# Patient Record
Sex: FEMALE | Race: BLACK OR AFRICAN AMERICAN | Employment: OTHER | URBAN - METROPOLITAN AREA
[De-identification: names, ages, dates, MRNs, and addresses within clinical notes are randomized per-mention and may not be internally consistent; named-entity substitution may affect disease eponyms.]

---

## 2019-09-18 ENCOUNTER — OFFICE VISIT (OUTPATIENT)
Dept: FAMILY MEDICINE CLINIC | Age: 49
End: 2019-09-18

## 2019-09-18 ENCOUNTER — HOSPITAL ENCOUNTER (OUTPATIENT)
Dept: LAB | Age: 49
Discharge: HOME | End: 2019-09-18

## 2019-09-18 ENCOUNTER — TELEPHONE (OUTPATIENT)
Dept: FAMILY MEDICINE CLINIC | Age: 49
End: 2019-09-18

## 2019-09-18 VITALS
SYSTOLIC BLOOD PRESSURE: 160 MMHG | HEART RATE: 62 BPM | BODY MASS INDEX: 37.83 KG/M2 | OXYGEN SATURATION: 98 % | RESPIRATION RATE: 16 BRPM | TEMPERATURE: 95.6 F | DIASTOLIC BLOOD PRESSURE: 90 MMHG | WEIGHT: 241 LBS | HEIGHT: 67 IN

## 2019-09-18 DIAGNOSIS — Z12.11 SCREENING FOR COLON CANCER: ICD-10-CM

## 2019-09-18 DIAGNOSIS — I10 ESSENTIAL HYPERTENSION: Primary | ICD-10-CM

## 2019-09-18 DIAGNOSIS — R51.9 ACUTE NONINTRACTABLE HEADACHE, UNSPECIFIED HEADACHE TYPE: ICD-10-CM

## 2019-09-18 DIAGNOSIS — M21.962 ACQUIRED DEFORMITY OF LEFT KNEE: ICD-10-CM

## 2019-09-18 DIAGNOSIS — H54.3 LOW VISION, BOTH EYES: ICD-10-CM

## 2019-09-18 DIAGNOSIS — E66.01 SEVERE OBESITY (HCC): ICD-10-CM

## 2019-09-18 DIAGNOSIS — I10 ESSENTIAL HYPERTENSION: ICD-10-CM

## 2019-09-18 LAB
ALBUMIN SERPL-MCNC: 4.2 G/DL (ref 3.4–5)
ALBUMIN/GLOB SERPL: 1.4 {RATIO} (ref 0.8–1.7)
ALP SERPL-CCNC: 142 U/L (ref 45–117)
ALT SERPL-CCNC: 103 U/L (ref 13–56)
ANION GAP SERPL CALC-SCNC: 4 MMOL/L (ref 3–18)
AST SERPL-CCNC: 40 U/L (ref 10–38)
BASOPHILS # BLD: 0 K/UL (ref 0–0.1)
BASOPHILS NFR BLD: 0 % (ref 0–2)
BILIRUB SERPL-MCNC: 0.4 MG/DL (ref 0.2–1)
BUN SERPL-MCNC: 11 MG/DL (ref 7–18)
BUN/CREAT SERPL: 12 (ref 12–20)
CALCIUM SERPL-MCNC: 10.3 MG/DL (ref 8.5–10.1)
CHLORIDE SERPL-SCNC: 104 MMOL/L (ref 100–111)
CO2 SERPL-SCNC: 28 MMOL/L (ref 21–32)
CREAT SERPL-MCNC: 0.9 MG/DL (ref 0.6–1.3)
DIFFERENTIAL METHOD BLD: NORMAL
EOSINOPHIL # BLD: 0.2 K/UL (ref 0–0.4)
EOSINOPHIL NFR BLD: 4 % (ref 0–5)
ERYTHROCYTE [DISTWIDTH] IN BLOOD BY AUTOMATED COUNT: 13.7 % (ref 11.6–14.5)
ERYTHROCYTE [SEDIMENTATION RATE] IN BLOOD: 11 MM/HR (ref 0–20)
EST. AVERAGE GLUCOSE BLD GHB EST-MCNC: 272 MG/DL
GLOBULIN SER CALC-MCNC: 3.1 G/DL (ref 2–4)
GLUCOSE SERPL-MCNC: 245 MG/DL (ref 74–99)
HBA1C MFR BLD: 11.1 % (ref 4.2–5.6)
HCT VFR BLD AUTO: 38.2 % (ref 35–45)
HGB BLD-MCNC: 12.7 G/DL (ref 12–16)
LYMPHOCYTES # BLD: 2.8 K/UL (ref 0.9–3.6)
LYMPHOCYTES NFR BLD: 44 % (ref 21–52)
MCH RBC QN AUTO: 27.6 PG (ref 24–34)
MCHC RBC AUTO-ENTMCNC: 33.2 G/DL (ref 31–37)
MCV RBC AUTO: 83 FL (ref 74–97)
MONOCYTES # BLD: 0.4 K/UL (ref 0.05–1.2)
MONOCYTES NFR BLD: 6 % (ref 3–10)
NEUTS SEG # BLD: 3 K/UL (ref 1.8–8)
NEUTS SEG NFR BLD: 46 % (ref 40–73)
PLATELET # BLD AUTO: 275 K/UL (ref 135–420)
PMV BLD AUTO: 11 FL (ref 9.2–11.8)
POTASSIUM SERPL-SCNC: 4.4 MMOL/L (ref 3.5–5.5)
PROT SERPL-MCNC: 7.3 G/DL (ref 6.4–8.2)
RBC # BLD AUTO: 4.6 M/UL (ref 4.2–5.3)
SODIUM SERPL-SCNC: 136 MMOL/L (ref 136–145)
TSH SERPL DL<=0.05 MIU/L-ACNC: 1.69 UIU/ML (ref 0.36–3.74)
WBC # BLD AUTO: 6.5 K/UL (ref 4.6–13.2)

## 2019-09-18 PROCEDURE — 83036 HEMOGLOBIN GLYCOSYLATED A1C: CPT

## 2019-09-18 PROCEDURE — 84443 ASSAY THYROID STIM HORMONE: CPT

## 2019-09-18 PROCEDURE — 80061 LIPID PANEL: CPT

## 2019-09-18 PROCEDURE — 80053 COMPREHEN METABOLIC PANEL: CPT

## 2019-09-18 PROCEDURE — 85652 RBC SED RATE AUTOMATED: CPT

## 2019-09-18 PROCEDURE — 85025 COMPLETE CBC W/AUTO DIFF WBC: CPT

## 2019-09-18 RX ORDER — LOSARTAN POTASSIUM 50 MG/1
TABLET ORAL
COMMUNITY
End: 2019-09-18 | Stop reason: DRUGHIGH

## 2019-09-18 RX ORDER — TERBINAFINE HYDROCHLORIDE 250 MG/1
TABLET ORAL
COMMUNITY
End: 2019-09-18

## 2019-09-18 RX ORDER — TROLAMINE SALICYLATE 10 G/100G
CREAM TOPICAL
COMMUNITY
Start: 2016-06-29 | End: 2019-09-18

## 2019-09-18 RX ORDER — IBUPROFEN 600 MG/1
TABLET ORAL
COMMUNITY
Start: 2016-05-10 | End: 2019-09-18

## 2019-09-18 RX ORDER — ONDANSETRON 4 MG/1
TABLET, ORALLY DISINTEGRATING ORAL
COMMUNITY
Start: 2018-08-06 | End: 2019-09-18

## 2019-09-18 RX ORDER — PREDNISONE 20 MG/1
TABLET ORAL
COMMUNITY
Start: 2016-06-29 | End: 2019-09-18

## 2019-09-18 RX ORDER — OXYCODONE AND ACETAMINOPHEN 5; 325 MG/1; MG/1
TABLET ORAL
COMMUNITY
End: 2019-09-18

## 2019-09-18 RX ORDER — LOSARTAN POTASSIUM 100 MG/1
100 TABLET ORAL DAILY
COMMUNITY
End: 2020-01-02 | Stop reason: SDUPTHER

## 2019-09-18 RX ORDER — SULFAMETHOXAZOLE AND TRIMETHOPRIM 800; 160 MG/1; MG/1
TABLET ORAL
COMMUNITY
End: 2019-09-18

## 2019-09-18 RX ORDER — NAPROXEN 500 MG/1
500 TABLET, DELAYED RELEASE ORAL
COMMUNITY
Start: 2017-12-13 | End: 2019-09-18

## 2019-09-18 RX ORDER — OMEPRAZOLE 20 MG/1
20 CAPSULE, DELAYED RELEASE ORAL
COMMUNITY
End: 2019-09-18

## 2019-09-18 RX ORDER — CYCLOBENZAPRINE HCL 5 MG
TABLET ORAL
COMMUNITY
End: 2019-09-18

## 2019-09-18 RX ORDER — PROMETHAZINE HYDROCHLORIDE 25 MG/1
TABLET ORAL
COMMUNITY
End: 2019-09-18

## 2019-09-18 RX ORDER — METOCLOPRAMIDE 10 MG/1
TABLET ORAL
COMMUNITY
End: 2019-09-18

## 2019-09-18 RX ORDER — AMLODIPINE BESYLATE 10 MG/1
TABLET ORAL
COMMUNITY
End: 2019-09-18

## 2019-09-18 RX ORDER — AZITHROMYCIN 250 MG/1
TABLET, FILM COATED ORAL
COMMUNITY
End: 2019-09-18 | Stop reason: ALTCHOICE

## 2019-09-18 RX ORDER — TRAMADOL HYDROCHLORIDE 50 MG/1
TABLET ORAL
COMMUNITY
Start: 2016-05-10 | End: 2019-09-18

## 2019-09-18 RX ORDER — PREDNISONE 5 MG/1
TABLET ORAL
COMMUNITY
End: 2019-09-18

## 2019-09-18 RX ORDER — NITROFURANTOIN 25; 75 MG/1; MG/1
CAPSULE ORAL
COMMUNITY
End: 2019-09-18

## 2019-09-18 RX ORDER — PREDNISONE 10 MG/1
TABLET ORAL
COMMUNITY
End: 2019-09-18

## 2019-09-18 RX ORDER — ALBUTEROL SULFATE 90 UG/1
AEROSOL, METERED RESPIRATORY (INHALATION)
COMMUNITY
End: 2020-01-03

## 2019-09-18 RX ORDER — BISMUTH SUBSALICYLATE 262 MG/15ML
30 LIQUID ORAL
COMMUNITY
End: 2019-09-18

## 2019-09-18 RX ORDER — LOSARTAN POTASSIUM 100 MG/1
TABLET ORAL
Refills: 1 | COMMUNITY
Start: 2019-08-19 | End: 2019-09-18 | Stop reason: SDUPTHER

## 2019-09-18 NOTE — PATIENT INSTRUCTIONS
You may qualify for a Free Mammogram and/or Pap Smear from [facility]. Please call [phone] to start the screening process so you can be scheduled for these important services. The process cannot begin until you make the call to begin screening.

## 2019-09-18 NOTE — PROGRESS NOTES
Chief Complaint   Patient presents with    Headache     x 1 week, vision blurred x 4months ,dizziness, off-balance x 3 months specks in both eyes x 1 month     Chief Complaint   Patient presents with    Headache     x 1 week, vision blurred x 4months ,dizziness, off-balance x 3 months specks in both eyes x 1 month    New Patient     1. When and where did you last receive medical care? Yes When: 8/2019- PCP Dr. Deondre Bailey  [facility]    2. When and where did you last have preventive care such as mammogram, pap smears or colon screening? PAP 2016? MAMMO 2017 colonoscopy 2014 polyps removed   3. What is your current living situation (for example, live alone, live in home with immediate family members)? Lives alone    4. Do you have any problems with communication such trouble seeing, hearing, or understanding instructions? No    5. Do you have an advance directive? This is a document that you can give to family members with instructions for how you would want them to make health care decisions for you if you were unable to speak for yourself. (For example, unconscious, delerious)No    PMH/FH/Social Hx reviewed and updated as needed     Applicable screenings reviewed and updated as needed  Medication reconciliation performed. Patient does not know need medication refills. Health Maintenance reviewed.

## 2019-09-18 NOTE — TELEPHONE ENCOUNTER
Patient advised of appointment with neurology Dr Latha Diaz 9/24/19 arrive at 8:30 for a 9:00 appointment [address] [facility],[address], [address]

## 2019-09-18 NOTE — PROGRESS NOTES
JEAN CARLOS Morales is a 47 y.o. female being seen today for   Chief Complaint   Patient presents with    Headache     x 1 week, vision blurred x 4months ,dizziness, off-balance x 3 months specks in both eyes x 1 month    New Patient   . she states that for the last few months her vision is not as good. She uses readers but for the last week they are not helping as much. For the last week she has specks in her vision. She gets some pain in right eye when reading. Today she had a headache when she woke up on right frontal head. Some episodes of feeling off balance. Does have hypertension. She is on bp medicine from her PCP Dr Herrera Britt. Last seen about 1 and 1/2 months ago. Family history of lupus. Past Medical History:   Diagnosis Date    Asthma     Bronchitis     Hiatal hernia 2013    Hypertension     Knee deformity, acquired 1996    left knee    Pancreatitis 2005         ROS  Patient states that she is feeling well. Denies complaints of chest pain, shortness of breath, swelling of legs, dizziness or weakness. she denies nausea, vomiting or diarrhea. Current Outpatient Medications   Medication Sig    losartan (COZAAR) 100 mg tablet Take 100 mg by mouth daily.  amLODIPine (NORVASC) 10 mg tablet Take 10 mg by mouth daily.  LORazepam (ATIVAN) 2 mg tablet Take 1 Tab by mouth once for 1 dose. Max Daily Amount: 2 mg.  insulin glargine (LANTUS,BASAGLAR) 100 unit/mL (3 mL) inpn Increase to 12 units daily. Indications: type 2 diabetes mellitus    ondansetron hcl (ZOFRAN) 4 mg tablet Take 1 Tab by mouth every eight (8) hours as needed for Nausea.  amitriptyline (ELAVIL) 10 mg tablet Take 1 Tab by mouth nightly for 30 days.  metFORMIN ER (GLUCOPHAGE XR) 500 mg tablet Take 1 Tab by mouth daily (with dinner).  cyclobenzaprine (FLEXERIL) 10 mg tablet Take 1 Tab by mouth nightly.     albuterol (VENTOLIN HFA) 90 mcg/actuation inhaler Ventolin HFA 90 mcg/actuation aerosol inhaler     No current facility-administered medications for this visit. PE  Visit Vitals  /90 (BP 1 Location: Left arm, BP Patient Position: Sitting) Comment (BP 1 Location): manual   Pulse 62   Temp 95.6 °F (35.3 °C) (Temporal)   Resp 16   Ht 5' 7\" (1.702 m)   Wt 241 lb (109.3 kg)   SpO2 98%   BMI 37.75 kg/m²        Alert and oriented with normal mood and affect. she is well developed and well nourished . Lungs are clear without wheezing. Heart rate is regular without murmurs or gallops. There is no lower extremity edema. CN II-XII intact although there is incomplete ability to move eyes medially with accomodation    Results for orders placed or performed during the hospital encounter of 12/03/09   HCG, UR, QL - POC   Result Value Ref Range    Pregnancy test,urine (POC) NEGATIVE  NEGATIVE         Assessment and Plan:        ICD-10-CM ICD-9-CM    1. Essential hypertension I43 548.9 METABOLIC PANEL, COMPREHENSIVE      LIPID PANEL      HEMOGLOBIN A1C WITH EAG      TSH 3RD GENERATION      CBC WITH AUTOMATED DIFF   2. Acquired deformity of left knee M21.962 736.6    3. Severe obesity ([facility] [address] E66.01 278.01    4. Screening for colon cancer Z12.11 V76.51 OCCULT BLOOD IMMUNOASSAY,DIAGNOSTIC   5. Acute nonintractable headache, unspecified headache type R51 784.0 SED RATE (ESR)      REFERRAL TO NEUROLOGY   6. Low vision, both eyes H54.3 369.20 REFERRAL TO NEUROLOGY      REFERRAL TO OPHTHALMOLOGY     Unclear cause of symptoms. Referral to neuro and ophtho  Check labs  For worsening will go to ED.   May benefit from imaging    Sharon Holman MD

## 2019-09-18 NOTE — PROGRESS NOTES
JEAN CARLOS Bundy is a 47 y.o. female being seen today for   Chief Complaint   Patient presents with    Headache     x 1 week, vision blurred x 4months ,dizziness, off-balance x 3 months specks in both eyes x 1 month    New Patient   . she states that for the last few months her vision is not as good. She uses readers but for the last week they are not helping as much. For the last week she has specks in her vision. She gets some pain in right eye when reading. Today she had a headache when she woke up on right frontal head. Does have hypertension. She is on bp medicine from her PCP doctor Radha Gusman. Last seen about 1 and 1/2 months ago. Past Medical History:   Diagnosis Date    Asthma     Bronchitis     Hiatal hernia 2013    Hypertension     Knee deformity, acquired 1996    left knee    Pancreatitis 2005         ROS  Patient states that she is feeling well. Denies complaints of chest pain, shortness of breath, swelling of legs, dizziness or weakness. she denies nausea, vomiting or diarrhea. Current Outpatient Medications   Medication Sig    losartan (COZAAR) 100 mg tablet losartan 100 mg tablet    amLODIPine (NORVASC) 10 mg tablet Take  by mouth daily.  predniSONE (STERAPRED) 5 mg dose pack prednisone 5 mg tablets in a dose pack    albuterol (VENTOLIN HFA) 90 mcg/actuation inhaler Ventolin HFA 90 mcg/actuation aerosol inhaler    azithromycin (ZITHROMAX) 250 mg tablet azithromycin 250 mg tablet    bismuth subsalicylate (PEPTO-BISMOL) 262 mg/15 mL suspension Take 30 mL by mouth.  cyclobenzaprine (FLEXERIL) 5 mg tablet cyclobenzaprine 5 mg tablet    ibuprofen (MOTRIN) 600 mg tablet ibuprofen 600 mg tablet    losartan (COZAAR) 100 mg tablet TAKE 1 TABLET BY MOUTH ONCE DAILY    losartan (COZAAR) 50 mg tablet losartan 50 mg tablet    metoclopramide HCl (REGLAN) 10 mg tablet metoclopramide 10 mg tablet    naproxen EC (NAPROSYN EC) 500 mg EC tablet Take 500 mg by mouth.     nitrofurantoin, macrocrystal-monohydrate, (MACROBID) 100 mg capsule Macrobid 100 mg capsule   Take 1 capsule every 12 hours by oral route for 7 days.  omeprazole (PRILOSEC) 20 mg capsule Take 20 mg by mouth.  ondansetron (ZOFRAN ODT) 4 mg disintegrating tablet ondansetron 4 mg disintegrating tablet    oxyCODONE-acetaminophen (PERCOCET) 5-325 mg per tablet oxycodone-acetaminophen 5 mg-325 mg tablet    promethazine (PHENERGAN) 25 mg tablet promethazine 25 mg tablet    trimethoprim-sulfamethoxazole (BACTRIM DS, SEPTRA DS) 160-800 mg per tablet sulfamethoxazole 800 mg-trimethoprim 160 mg tablet    terbinafine HCl (LAMISIL) 250 mg tablet terbinafine HCl 250 mg tablet    traMADol (ULTRAM) 50 mg tablet tramadol 50 mg tablet    trolamine salicylate (MYOFLEX) 10 % topical cream Apply  to affected area.  amLODIPine (NORVASC) 10 mg tablet amlodipine 10 mg tablet    predniSONE (DELTASONE) 20 mg tablet 3 tabs po in am x 2 days, then 2 tabs x 2 days. Or as dir. Do not combine c NSAIDs.  predniSONE (DELTASONE) 10 mg tablet prednisone 10 mg tablet    methocarbamol (ROBAXIN) 750 mg tablet Take 1 Tab by mouth four (4) times daily. No current facility-administered medications for this visit. PE  Visit Vitals  /90 (BP 1 Location: Left arm, BP Patient Position: Sitting) Comment (BP 1 Location): manual   Pulse 62   Temp 95.6 °F (35.3 °C) (Temporal)   Resp 16   Ht 5' 7\" (1.702 m)   Wt 241 lb (109.3 kg)   SpO2 98%   BMI 37.75 kg/m²        Alert and oriented with normal mood and affect. she is well developed and well nourished . Lungs are clear without wheezing. Heart rate is regular without murmurs or gallops. There is no lower extremity edema. Results for orders placed or performed during the hospital encounter of 12/03/09   HCG, UR, QL - POC   Result Value Ref Range    Pregnancy test,urine (POC) NEGATIVE  NEGATIVE         Assessment and Plan:        ICD-10-CM ICD-9-CM    1.  Acquired deformity of left knee M21.962 736.6    2.  Severe obesity ([address] E66.01 278.01            Kale Lozano MD

## 2019-09-19 LAB
CHOLEST SERPL-MCNC: 123 MG/DL
HDLC SERPL-MCNC: 35 MG/DL (ref 40–60)
HDLC SERPL: 3.5 {RATIO} (ref 0–5)
LDLC SERPL CALC-MCNC: 73.8 MG/DL (ref 0–100)
LIPID PROFILE,FLP: ABNORMAL
TRIGL SERPL-MCNC: 71 MG/DL (ref ?–150)
VLDLC SERPL CALC-MCNC: 14.2 MG/DL

## 2019-09-20 ENCOUNTER — TELEPHONE (OUTPATIENT)
Dept: FAMILY MEDICINE CLINIC | Age: 49
End: 2019-09-20

## 2019-09-20 NOTE — TELEPHONE ENCOUNTER
Can you  call this nice patient and let her know her blood sugar is high. She has diabetes and this could be causing her symptoms. She should cut way back on sugary and starchy foods and follow up in clinic at her earliest convenience. Drink plenty of water.

## 2019-09-24 ENCOUNTER — OFFICE VISIT (OUTPATIENT)
Dept: FAMILY MEDICINE CLINIC | Age: 49
End: 2019-09-24

## 2019-09-24 ENCOUNTER — OFFICE VISIT (OUTPATIENT)
Dept: NEUROLOGY | Age: 49
End: 2019-09-24

## 2019-09-24 VITALS
HEIGHT: 67 IN | OXYGEN SATURATION: 98 % | SYSTOLIC BLOOD PRESSURE: 152 MMHG | BODY MASS INDEX: 37.89 KG/M2 | TEMPERATURE: 97.7 F | WEIGHT: 241.4 LBS | HEART RATE: 58 BPM | RESPIRATION RATE: 20 BRPM | DIASTOLIC BLOOD PRESSURE: 94 MMHG

## 2019-09-24 VITALS
WEIGHT: 240 LBS | HEIGHT: 67 IN | HEART RATE: 55 BPM | RESPIRATION RATE: 16 BRPM | BODY MASS INDEX: 37.67 KG/M2 | SYSTOLIC BLOOD PRESSURE: 130 MMHG | OXYGEN SATURATION: 97 % | DIASTOLIC BLOOD PRESSURE: 90 MMHG | TEMPERATURE: 97.7 F

## 2019-09-24 DIAGNOSIS — E11.49 CONTROLLED TYPE 2 DIABETES MELLITUS WITH OTHER NEUROLOGIC COMPLICATION, WITH LONG-TERM CURRENT USE OF INSULIN (HCC): Primary | ICD-10-CM

## 2019-09-24 DIAGNOSIS — Z79.4 CONTROLLED TYPE 2 DIABETES MELLITUS WITH OTHER NEUROLOGIC COMPLICATION, WITH LONG-TERM CURRENT USE OF INSULIN (HCC): Primary | ICD-10-CM

## 2019-09-24 DIAGNOSIS — G44.52 NEW DAILY PERSISTENT HEADACHE: Primary | ICD-10-CM

## 2019-09-24 DIAGNOSIS — H54.3 LOW VISION, BOTH EYES: ICD-10-CM

## 2019-09-24 RX ORDER — INSULIN GLARGINE 100 [IU]/ML
INJECTION, SOLUTION SUBCUTANEOUS
Qty: 5 PEN | Refills: 0 | Status: SHIPPED | COMMUNITY
Start: 2019-09-24 | End: 2019-10-01

## 2019-09-24 RX ORDER — AMITRIPTYLINE HYDROCHLORIDE 10 MG/1
10 TABLET, FILM COATED ORAL
Qty: 30 TAB | Refills: 2 | Status: SHIPPED | OUTPATIENT
Start: 2019-09-24 | End: 2019-10-15

## 2019-09-24 RX ORDER — METFORMIN HYDROCHLORIDE 500 MG/1
500 TABLET, EXTENDED RELEASE ORAL
Qty: 30 TAB | Refills: 5 | Status: SHIPPED | OUTPATIENT
Start: 2019-09-24 | End: 2020-01-02 | Stop reason: SDUPTHER

## 2019-09-24 RX ORDER — CYCLOBENZAPRINE HCL 10 MG
10 TABLET ORAL
Qty: 30 TAB | Refills: 1 | Status: SHIPPED | OUTPATIENT
Start: 2019-09-24 | End: 2020-01-03

## 2019-09-24 NOTE — PATIENT INSTRUCTIONS
You may qualify for a Free Mammogram and/or Pap Smear from [facility]. Please call [phone] to start the screening process so you can be scheduled for these important services. The process cannot begin until you make the call to begin screening. Neck: Exercises  Introduction  Here are some examples of exercises for you to try. The exercises may be suggested for a condition or for rehabilitation. Start each exercise slowly. Ease off the exercises if you start to have pain. You will be told when to start these exercises and which ones will work best for you. How to do the exercises  Neck stretch    1. This stretch works best if you keep your shoulder down as you lean away from it. To help you remember to do this, start by relaxing your shoulders and lightly holding on to your thighs or your chair. 2. Tilt your head toward your shoulder and hold for 15 to 30 seconds. Let the weight of your head stretch your muscles. 3. If you would like a little added stretch, use your hand to gently and steadily pull your head toward your shoulder. For example, keeping your right shoulder down, lean your head to the left. 4. Repeat 2 to 4 times toward each shoulder. Diagonal neck stretch    1. Turn your head slightly toward the direction you will be stretching, and tilt your head diagonally toward your chest and hold for 15 to 30 seconds. 2. If you would like a little added stretch, use your hand to gently and steadily pull your head forward on the diagonal.  3. Repeat 2 to 4 times toward each side. Dorsal glide stretch    1. Sit or stand tall and look straight ahead. 2. Slowly tuck your chin as you glide your head backward over your body  3. Hold for a count of 6, and then relax for up to 10 seconds. 4. Repeat 8 to 12 times. Chest and shoulder stretch    1. Sit or stand tall and glide your head backward as in the dorsal glide stretch.   2. Raise both arms so that your hands are next to your ears.  3. Take a deep breath, and as you breathe out, lower your elbows down and behind your back. You will feel your shoulder blades slide down and together, and at the same time you will feel a stretch across your chest and the front of your shoulders. 4. Hold for about 6 seconds, and then relax for up to 10 seconds. 5. Repeat 8 to 12 times. Strengthening: Hands on head    1. Move your head backward, forward, and side to side against gentle pressure from your hands, holding each position for about 6 seconds. 2. Repeat 8 to 12 times. Follow-up care is a key part of your treatment and safety. Be sure to make and go to all appointments, and call your doctor if you are having problems. It's also a good idea to know your test results and keep a list of the medicines you take. Where can you learn more? Go to http://dominik-mahin.info/. Enter P975 in the search box to learn more about \"Neck: Exercises. \"  Current as of: June 26, 2019  Content Version: 12.2  © 4425-4795 [employer]. Care instructions adapted under license by [employer] (which disclaims liability or warranty for this information). If you have questions about a medical condition or this instruction, always ask your healthcare professional. [address] any warranty or liability for your use of this information.

## 2019-09-24 NOTE — PROGRESS NOTES
Cyndi Falcon is a 47 y.o. female new patient in today to discuss HAs. Patient reports active head pain in front and back 6/10. Learning assessment previously completed 9/18/2019; primary language is [place].

## 2019-09-24 NOTE — PROGRESS NOTES
HPI  Nando Jennings is a 47 y.o. female being seen today for   Chief Complaint   Patient presents with    Follow-up   . follow up for this pt with new dx of diabetes. She continues to have HA, dizziness, occasional loss of balance. she states that she does not eat many carbs and feels her diet is healthy. Did see neuro who ordered mri of her brain  Has not yet seen ophtho    Past Medical History:   Diagnosis Date    Asthma     Bronchitis     Hiatal hernia 2013    Hypertension     Knee deformity, acquired 1996    left knee    Pancreatitis 2005         ROS  Patient states that she is feeling well. Denies complaints of chest pain, shortness of breath, swelling of legs, dizziness or weakness. she denies nausea, vomiting or diarrhea. Current Outpatient Medications   Medication Sig    amitriptyline (ELAVIL) 10 mg tablet Take 1 Tab by mouth nightly for 30 days.  insulin glargine (LANTUS,BASAGLAR) 100 unit/mL (3 mL) inpn 10 units daily    metFORMIN ER (GLUCOPHAGE XR) 500 mg tablet Take 1 Tab by mouth daily (with dinner).  cyclobenzaprine (FLEXERIL) 10 mg tablet Take 1 Tab by mouth nightly.  albuterol (VENTOLIN HFA) 90 mcg/actuation inhaler Ventolin HFA 90 mcg/actuation aerosol inhaler    losartan (COZAAR) 100 mg tablet losartan 100 mg tablet    amLODIPine (NORVASC) 10 mg tablet Take  by mouth daily.  ondansetron hcl (ZOFRAN) 4 mg tablet Take 1 Tab by mouth every eight (8) hours as needed for Nausea. No current facility-administered medications for this visit. PE  Visit Vitals  /90 (BP 1 Location: Left arm, BP Patient Position: Sitting)   Pulse (!) 55   Temp 97.7 °F (36.5 °C) (Temporal)   Resp 16   Ht 5' 7\" (1.702 m)   Wt 240 lb (108.9 kg)   LMP 01/24/2014 (LMP Unknown)   SpO2 97%   BMI 37.59 kg/m²        Alert and oriented with normal mood and affect. she is well developed and well nourished . Lungs are clear without wheezing.  Heart rate is regular without murmurs or gallops. There is no lower extremity edema. Assessment and Plan:        ICD-10-CM ICD-9-CM    1. 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 Low vision, both eyes H54.3 369.20      Symptoms may be due to hyperglycemia  Reviewed insulin is the fastest way to control her sugars.   She agrees to try    Sample basaglar 10 units daily and metformin xr 500mg  Refer to nurse navigator for follow up and insulin titration        Demetrice Torres MD
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
## 2019-09-24 NOTE — LETTER
9/24/19 Patient: Hoda Perez YOB: 1970 Date of Visit: 9/24/2019 Sharmin Rocha MD 
[facility] [id number] [phone] VIA In Basket Dear Sharmin Rocha MD, Thank you for referring Ms. Hoda Perez to [facility] for evaluation. My notes for this consultation are attached. If you have questions, please do not hesitate to call me. I look forward to following your patient along with you. Sincerely, Gustabo Becerra MD

## 2019-09-25 ENCOUNTER — TELEPHONE (OUTPATIENT)
Dept: FAMILY MEDICINE CLINIC | Age: 49
End: 2019-09-25

## 2019-09-25 NOTE — TELEPHONE ENCOUNTER
Patient seen by Dr Kathyanne Phoenix on 9/24/19 to discuss diabetes diagnosis and medication prescribed

## 2019-09-26 RX ORDER — ONDANSETRON 4 MG/1
4 TABLET, FILM COATED ORAL
Qty: 21 TAB | Refills: 1 | Status: SHIPPED | OUTPATIENT
Start: 2019-09-26 | End: 2020-01-03

## 2019-09-26 NOTE — TELEPHONE ENCOUNTER
Patient states still nauseated got glucometer yesterday blood sugar was 371 yesterday hasn't taken it today.  Patient also stated that she thought the short acting insulin was part of the treatment plan you discussed

## 2019-09-27 NOTE — TELEPHONE ENCOUNTER
Patient advised The metformin is probably causing her nausea.  If its not that bad she can keep taking it and the nausea will go away. Will send in some zofran to her pharmacy for her. And she  can come to  a sample of humalog from clinic any day and we would need to review sliding scale with her. and she can also increse her lantus to 14 units daily

## 2019-10-01 ENCOUNTER — HOSPITAL ENCOUNTER (OUTPATIENT)
Dept: MRI IMAGING | Age: 49
Discharge: HOME | End: 2019-10-01
Attending: STUDENT IN AN ORGANIZED HEALTH CARE EDUCATION/TRAINING PROGRAM

## 2019-10-01 ENCOUNTER — OFFICE VISIT (OUTPATIENT)
Dept: FAMILY MEDICINE CLINIC | Age: 49
End: 2019-10-01

## 2019-10-01 VITALS
TEMPERATURE: 98 F | OXYGEN SATURATION: 98 % | HEIGHT: 67 IN | RESPIRATION RATE: 18 BRPM | HEART RATE: 59 BPM | DIASTOLIC BLOOD PRESSURE: 80 MMHG | BODY MASS INDEX: 37.51 KG/M2 | WEIGHT: 239 LBS | SYSTOLIC BLOOD PRESSURE: 130 MMHG

## 2019-10-01 DIAGNOSIS — Z00.00 HEALTHCARE MAINTENANCE: Primary | ICD-10-CM

## 2019-10-01 DIAGNOSIS — I10 ESSENTIAL HYPERTENSION: ICD-10-CM

## 2019-10-01 DIAGNOSIS — Z79.4 CONTROLLED TYPE 2 DIABETES MELLITUS WITH OTHER NEUROLOGIC COMPLICATION, WITH LONG-TERM CURRENT USE OF INSULIN (HCC): ICD-10-CM

## 2019-10-01 DIAGNOSIS — E11.49 CONTROLLED TYPE 2 DIABETES MELLITUS WITH OTHER NEUROLOGIC COMPLICATION, WITH LONG-TERM CURRENT USE OF INSULIN (HCC): ICD-10-CM

## 2019-10-01 DIAGNOSIS — M54.2 NECK PAIN: ICD-10-CM

## 2019-10-01 DIAGNOSIS — G44.52 NEW DAILY PERSISTENT HEADACHE: ICD-10-CM

## 2019-10-01 LAB — GLUCOSE POC: 206 MG/DL

## 2019-10-01 RX ORDER — INSULIN GLARGINE 100 [IU]/ML
INJECTION, SOLUTION SUBCUTANEOUS
Qty: 5 PEN | Refills: 0 | Status: SHIPPED | COMMUNITY
Start: 2019-10-01 | End: 2020-01-02 | Stop reason: SDUPTHER

## 2019-10-01 NOTE — PROGRESS NOTES
HISTORY OF PRESENT ILLNESS  Thuan Brock is a 47 y.o. female here for DM F/U. HPI Ms. Lorraine Escobdeo was recently diagnosed with DM and started on Lantus and Metformin at her last visit here. She is tolerating the Lantus well. The Metformin has been causing some diarrhea but it is getting better. She feels she is staying properly hydrated. Her glucose monitoring book shows daily levels 200-250. She is learning about the DM diet and making changes daily. She does not do much exercising. She was to have an MRI today for her HA's but could not tolerate the enclosed space. Neuro is rescheduling her w/Ativan. Also, she still has left-sided neck pain. She admits to not taking the Flexeril unless the pain is really bad. She doesn't take anything during the day. The pain radiates down the left shoulder at times. She denies CP, SOB and abdominal pain. Non-smoker. Review of Systems   Constitutional: Negative. Respiratory: Negative. Cardiovascular: Negative. Gastrointestinal: Negative. Genitourinary: Negative. Musculoskeletal: Positive for neck pain. Negative for back pain and falls. Neurological: Positive for headaches. Negative for seizures, loss of consciousness and weakness. Psychiatric/Behavioral: Negative for depression. Physical Exam   Constitutional: She is oriented to person, place, and time. She appears well-developed and well-nourished. Neck: No thyromegaly present. Slight discomfort w/neck flexion to the left, no edema or redness, NTP. Cardiovascular: Normal rate, regular rhythm, normal heart sounds and intact distal pulses. No murmur heard. AP=68   Pulmonary/Chest: Effort normal and breath sounds normal.   Abdominal: Soft. Bowel sounds are normal.   Musculoskeletal: She exhibits tenderness. She exhibits no edema. Neurological: She is alert and oriented to person, place, and time. She has normal reflexes. Skin: Skin is warm and dry.    Psychiatric: She has a normal mood and affect. Her behavior is normal. Thought content normal.   Nursing note and vitals reviewed. ASSESSMENT and PLAN    ICD-10-CM ICD-9-CM    1. Healthcare maintenance Z00.00 V70.0 AMB POC GLUCOSE BLOOD, BY GLUCOSE MONITORING DEVICE   2. Essential hypertension  Continue with current BP medications per PCP I10 401.9    3. 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 Neck pain  Take Flexeril before bed x 10 nights  Take OTC Ibuprofen or Aleve during the day x 10 days  Continue with home PT exercises  If not relieved by next visit, will consider PT  RTC in 1 month for DM re-eval and neck concerns  Ms. Gisele Hernandez agrees to plan M54.2 723.1

## 2019-10-01 NOTE — ROUTINE PROCESS
Pt in for MRI head, headaches for a month increasing in severity over the past several weeks with RUE pain now. MRI attempted, pt unable to tolerate due to claustrophobia. Pt to get order for sedation and r/s. Pt also aware she needs to have someone drive her for her MRI.

## 2019-10-01 NOTE — PROGRESS NOTES
Chief Complaint   Patient presents with   [facility] Follow Up     ED visit HTN - and diabetes follow up     1. Have you been to the ER, urgent care clinic since your last visit? Hospitalized since your last visit? Yes When: 9/25/2019 ED visit Aniket Srinivasan Blood sugar -371.    2. Have you seen or consulted any other health care providers outside of the [address] [place] [address] since your last visit? Include any pap smears or colon screening.  Yes When: 9/25/19- see above

## 2019-10-02 ENCOUNTER — PATIENT OUTREACH (OUTPATIENT)
Dept: FAMILY MEDICINE CLINIC | Age: 49
End: 2019-10-02

## 2019-10-02 NOTE — PROGRESS NOTES
Was asked by Dr. Page Morales to open patient to Case Management for Diabetes due to new diagnosis and A1C of 11.1. PMH:   Past Medical History:   Diagnosis Date    Asthma     Bronchitis     Hiatal hernia 2013    Hypertension     Knee deformity, acquired 1996    left knee    Pancreatitis 2005       Two patient identifiers verified. Discussed the role of Nurse Navigator and case management program.  Patient agrees to case management services at this time. Patient reports BG's still in the 200's. She is taking metformin every night and using Lantus 10 units. Patient's current understanding of a diabetic diet is to cut out carbs, decrease sugar intake, eat more vegetables and limit fruit. 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 Patient was given a booklet about diabetes at her PCP appt yesterday. Pt states she does not like bread, pasta or rice, so rarely eats any of it. She does not like candy and does not drink soda. States before she received her diagnosis of DM, she noticed the symptoms and started to cut out more carbs. She mostly drinks water but was drinking Gatorade previously. She drinks unsweetened tea and adds some Splenda. She does currently drink grapefruit juice. Typical diet:  Breakfast- Cheerios with skim milk  Lunch- [place] and broccoli  Dinner- [place] [employer] with beef, cheese, tomatoes and lettuce    Advised per provider note from yesterday, pt is to increase Lantus to 12 units nightly. Discussed the importance of keeping fruit in her diet and recommended pairing with a protein. Advised to keep grapefruit juice to 4 oz and dilute with water. Advised adding an egg to her breakfast. Advised to attend free diabetes classes at [facility]. Care Plan developed with patient and mailed along with fruit portion sizes and diabetes class flyer. Patient verbalized understanding of all information discussed.   Pt has my contact information for any further questions, concerns, or needs. Patient expressed no questions, concerns or needs for this NN at this time. Will follow up with patient next week.

## 2019-10-02 NOTE — PATIENT INSTRUCTIONS
Diabetes Care Plan What is DM? Diabetes is a problem with your body that causes blood glucose (sugar) levels to rise higher than normal. Your body does not use insulin properly. At first, your pancreas makes extra insulin to make up for it. But, over time it isn't able to keep up and can't make enough insulin to keep your blood glucose at normal levels. Goal: Blood sugar goal before a meal should be below 130mg/dl. Blood sugar goal two hours after a meal should be below 180mg/dl. 1. Continue monitoring your blood glucose twice a day. 2. Record your blood sugar readings on your logs. 3. Review fruit portion sizes. 4. Attend free diabetes classes at [facility]. 5.Take all your medication as prescribed. Call us when you need refills. 6. Return for reevaluation in 1 month and every 3 months to monitor your Diabetes. 7. Bring your blood sugar logs and your medication bottle at each visit with us. 8. Contact me if you have any questions or concerns @ [phone] You have chosen to work on this goal: Add some protein to your breakfast such as an egg or yogurt. Eat protein anytime you have a carb, such as pairing fruit with nuts, low sugar peanut butter, cheese or low sugar yogurt. Diabetes increases your risk for many serious health problems. The good news? With the correct treatment and recommended lifestyle changes (eating healthy, exercising and taking your medication), many people with diabetes are able to prevent or delay the onset of complications. Next re-evaluation 10/8/19.

## 2019-10-02 NOTE — LETTER
10/3/2019 10:58 AM 
 
Ms. Chantelle Kim [address] [facility] [address] Dear Chantelle Julio, It was a pleasure talking with you about your Diabetes today. As we discussed, I am mailing you a copy of your Diabetes Care Plan, fruit portion sizes, and Diabetes class flyer. Please call me if you have any questions or concerns @ [phone].  
 
 
 
 
Sincerely, 
 
 
Nathan Arteaga RN

## 2019-10-04 ENCOUNTER — TELEPHONE (OUTPATIENT)
Dept: NEUROLOGY | Age: 49
End: 2019-10-04

## 2019-10-04 NOTE — TELEPHONE ENCOUNTER
Pt states she was unable to complete MRI on 10/2. SHe requests sedative for future MRI appt. Please advise.

## 2019-10-07 NOTE — PROGRESS NOTES
Ms. Ary Barboza was made aware of her POC glucose results in clinic. Lantus increased to 12 units/day.

## 2019-10-08 DIAGNOSIS — G44.52 NEW DAILY PERSISTENT HEADACHE: Primary | ICD-10-CM

## 2019-10-08 RX ORDER — LORAZEPAM 2 MG/1
2 TABLET ORAL ONCE
Qty: 1 TAB | Refills: 0 | Status: SHIPPED | OUTPATIENT
Start: 2019-10-08 | End: 2019-10-08

## 2019-10-09 ENCOUNTER — PATIENT OUTREACH (OUTPATIENT)
Dept: FAMILY MEDICINE CLINIC | Age: 49
End: 2019-10-09

## 2019-10-09 NOTE — LETTER
10/9/2019 12:02 PM 
 
Ms. Lucie Chaney [address] [facility] [address] Dear Lucie Chaney, It was a pleasure talking with you about your Diabetes today. As we discussed, I am mailing you a copy of your updated Diabetes Care Plan, information about low blood sugar, and diabetes and exercise. Unfortunately you will get this information before the info I sent to your old address last week. I will get that to you as soon as possible. I just have to get to the office to copy something to send you. Please call me if you have any questions or concerns @ [phone].  
 
 
 
 
Sincerely, 
 
 
Godfrey Pina RN

## 2019-10-09 NOTE — PROGRESS NOTES
Follow up Chronic Condition    Focus: Diabetes    Patient reports fatigue and asks if the metformin for Lantus could be causing it. BG's are now 160's-170's. She had a reading of 257 last night but realized it was probably from the BBQ sauce she had. Patient has been assessing her diet to try to figure out what has caused her to become diabetic. States she does like french fries and fried hot wings. She has recently cut these out of her diet. Reports her brother is a double amputee from diabetes so this is motivation for her to do better. Has c/o neck spasm and plans to f/u with PCP concerning this tomorrow. States she has applied heat, taken NSAIDS and flexeril with no relief for 3 weeks. Advised that fatigue is not a common adverse reaction to these meds. However, her body may need to adjust to having lower BG. Advised to discuss with PCP at next f/u if she is still experiencing fatigue. Discussed hypoglycemia and how to treat. Discussed how losing weight may help in glucose control and recommended including walking for exercise daily. Mailed to patient updated care plan along with Low Blood Sugar handout and Learning About Diabetes and Exercise. Will follow up with patient next week. Patient has my number if questions arise.

## 2019-10-09 NOTE — PATIENT INSTRUCTIONS
Diabetes Care Plan- Updated 10/9/19 
  
Goal: Blood sugar goal before a meal should be below 130mg/dl. Blood sugar goal two hours after a meal should be below 180mg/dl.  
  
1. Continue monitoring your blood glucose twice a day. 2. Record your blood sugar readings on your logs. 3. Review Learning about Diabetes and Exercise. 4. Start walking daily for exercise. 5. Attend free diabetes classes at [facility]. 6. Return for reevaluation in 1 month and every 3 months to monitor your Diabetes. 7. Bring your blood sugar logs and your medication bottle at each visit with us. 8. Contact me if you have any questions or concerns @ [phone] 
  
You have chosen to work on this goal: Add some protein to your breakfast such as an egg or yogurt. Eat protein anytime you have a carb, such as pairing fruit with nuts, low sugar peanut butter, cheese or low sugar yogurt.  
  
Diabetes increases your risk for many serious health problems. The good news? With the correct treatment and recommended lifestyle changes (eating healthy, exercising and taking your medication), many people with diabetes are able to prevent or delay the onset of complications.  
  
  
Next re-evaluation 10/18/19. Learning About Diabetes and Exercise Can you exercise if you have diabetes? When you have diabetes, it's important to get regular exercise. This helps control your blood sugar level. You can still play sports, run, ride a bike, go swimming, and do other activities when you have diabetes. How can exercise help you manage diabetes? Your body turns the food you eat into glucose, a type of sugar. You need this sugar for energy. When you have diabetes, the sugar builds up in your blood. But when you exercise, your body uses sugar. This helps keep it from building up in your blood and results in lower blood sugar and better control of diabetes. Exercise may help you in other ways too.  It can help you reach and stay at a healthy weight. It also helps improve blood pressure and cholesterol, which can reduce the risk of heart disease. Exercise can make you feel stronger and happier. It can help you relax and sleep better, and give you confidence in other things you do. How can you exercise safely? Before you start a new exercise program, talk to your doctor about how and when to exercise. You may need to have a medical exam and tests before you begin. Some types of exercise can be harmful if your diabetes is causing other problems, such as problems with your feet. Your doctor can tell you what types of exercise are good choices for you. These tips can help you exercise safely when you have diabetes. If your diabetes is controlled by diet or medicine that doesn't lower your blood sugar, you don't need to eat a snack before you exercise. · Check your blood sugar before you exercise. And be careful about what you eat. ? If your blood sugar is less than 100, eat a carbohydrate snack before you exercise. ? Be careful when you exercise if your blood sugar is over 300. High blood sugar can make you dehydrated. And that makes your blood sugar levels go even higher. If you have ketones in your blood or urine and your blood sugar is over 300, do not exercise. · Don't try to do too much at first. Build up your exercise program bit by bit. Try to get at least 30 minutes of exercise on most days of the week. Walking is a good choice. You also may want to do other activities, such as riding a bike or swimming. You might try running or gardening. Try to include muscle-strengthening exercises at least 2 times a week. These exercises include push-ups and weight training. You can also use rubber tubing or stretch bands. You stretch or pull the tubing or band to build muscle strength. If you want to exercise more, slowly increase how hard or long you exercise.  
· You may get symptoms of low blood sugar during exercise or up to 24 hours later. Some symptoms of low blood sugar, such as sweating, a fast heartbeat, or feeling tired, can be confused with what can happen anytime you exercise. Other symptoms may include feeling anxious, dizzy, weak, or shaky. So it's a good idea to check your blood sugar again. · You can treat low blood sugar by eating or drinking something that has 15 grams of carbohydrate. These should be quick-sugar foods. [place] foods such as fruit juice, regular (not diet) soda, glucose tablets, hard candy, or raisins can help raise blood sugar. Check your blood sugar level again 15 minutes after having a quick-sugar food to make sure your level is getting back to your target range. · Drink plenty of water before, during, and after you exercise. · Wear medical alert jewelry that says you have diabetes. You can buy this at most drugstores. · Pay attention to your body. If you are used to exercise and notice that you can't do as much as usual, talk to your doctor. Follow-up care is a key part of your treatment and safety. Be sure to make and go to all appointments, and call your doctor if you are having problems. It's also a good idea to know your test results and keep a list of the medicines you take. Where can you learn more? Go to http://dominik-mahin.info/. Enter S404 in the search box to learn more about \"Learning About Diabetes and Exercise. \" Current as of: April 16, 2019 Content Version: 12.2 © 0572-0377 [employer]. Care instructions adapted under license by [employer] (which disclaims liability or warranty for this information). If you have questions about a medical condition or this instruction, always ask your healthcare professional. [address] any warranty or liability for your use of this information.

## 2019-10-10 ENCOUNTER — HOSPITAL ENCOUNTER (OUTPATIENT)
Dept: MRI IMAGING | Age: 49
Discharge: HOME | End: 2019-10-10
Attending: STUDENT IN AN ORGANIZED HEALTH CARE EDUCATION/TRAINING PROGRAM
Payer: SELF-PAY

## 2019-10-10 PROCEDURE — 70551 MRI BRAIN STEM W/O DYE: CPT

## 2019-10-11 ENCOUNTER — TELEPHONE (OUTPATIENT)
Dept: FAMILY MEDICINE CLINIC | Age: 49
End: 2019-10-11

## 2019-10-11 NOTE — TELEPHONE ENCOUNTER
This patient called stating she is in a lot of neck pain and wanted something for the pain. After reviewing her records I found she had undergone a brain scan yesterday which was ordered by her neurologist.    Gabriel Saucedo provider had ordered her to continue taking Flexeril and over the counter meds and provided her with PT exercises for her neck. I asked if she was taking the medication and doing the exercises and she replied she was taking the meds which weren't helping and not doing the exercises due to the pain. I advised her to reach out to her neurologist who had ordered the brain scan and followup with him. She agreed to do so.

## 2019-10-15 ENCOUNTER — OFFICE VISIT (OUTPATIENT)
Dept: NEUROLOGY | Age: 49
End: 2019-10-15

## 2019-10-15 VITALS
TEMPERATURE: 98.3 F | HEART RATE: 86 BPM | RESPIRATION RATE: 22 BRPM | DIASTOLIC BLOOD PRESSURE: 100 MMHG | BODY MASS INDEX: 37.67 KG/M2 | WEIGHT: 240 LBS | SYSTOLIC BLOOD PRESSURE: 156 MMHG | HEIGHT: 67 IN

## 2019-10-15 DIAGNOSIS — G44.86 CERVICOGENIC HEADACHE: ICD-10-CM

## 2019-10-15 DIAGNOSIS — M54.12 CERVICAL RADICULOPATHY: Primary | ICD-10-CM

## 2019-10-15 RX ORDER — AMITRIPTYLINE HYDROCHLORIDE 25 MG/1
25 TABLET, FILM COATED ORAL
Qty: 30 TAB | Refills: 3 | Status: SHIPPED | OUTPATIENT
Start: 2019-10-15 | End: 2019-11-14

## 2019-10-15 NOTE — PROGRESS NOTES
Nicholas Jeronimo is a 47 y.o. female . presents for ED Follow-up [facility])       Mrs. Nba Rowe came for follow-up of her headache. She said that her headache is still about the same. Is more in the occipital area pressure-like tightness. That she has more pain over her left side of the neck with difficulty turning her neck to the left side. The pain radiates to the left upper extremity with tingling sensation. Also have difficulty lifting the left arm with pain. Feels that there is a little bit of swelling around the shoulder. The pain is 10 out of 10 in severity. She has a specific nausea but no vomiting. Has no diplopia. She has blurring of vision. No diplopia. Has no weakness. No difficulty walking. But she feels dizzy. No difficulty with her balance. She was seen at [facility] in [place] twice last week. X-rays of the neck and shoulder were negative. She has had a lumbar puncture was done with measurement of the opening pressure. I could not get the results of the lumbar puncture. Review of Systems   Constitutional: Positive for malaise/fatigue. Negative for chills, fever and weight loss. HENT: Positive for tinnitus. Negative for ear discharge, ear pain and hearing loss. Eyes: Positive for blurred vision and photophobia. Negative for double vision. Respiratory: Negative for cough, shortness of breath and wheezing. Cardiovascular: Negative for chest pain, palpitations and leg swelling. Gastrointestinal: Positive for diarrhea (loose stool), heartburn and nausea. Negative for vomiting. Genitourinary: Negative for dysuria, hematuria and urgency. Musculoskeletal: Positive for myalgias (left shoulder) and neck pain (left side; difficulty turning to the left). Negative for back pain. Neurological: Positive for dizziness, tingling (left arm), sensory change (LUE numbness) and headaches. Endo/Heme/Allergies: Does not bruise/bleed easily.    Psychiatric/Behavioral: Negative for depression.        Past Medical History:   Diagnosis Date    Asthma     Bronchitis     Hiatal hernia 2013    Hypertension     Knee deformity, acquired 1996    left knee    Pancreatitis 2005       Past Surgical History:   Procedure Laterality Date    HX CHOLECYSTECTOMY  2001    lap    HX HERNIA REPAIR      2012 and 2013 by Dr Honeycutt Necessary    knee surgery        Family History   Problem Relation Age of Onset    Lupus Mother     Heart Disease Father     No Known Problems Sister     Lupus Brother     No Known Problems Sister         Social History     Socioeconomic History    Marital status: UNKNOWN     Spouse name: Not on file    Number of children: Not on file    Years of education: Not on file    Highest education level: Not on file   Occupational History    Not on file   Social Needs    Financial resource strain: Not on file    Food insecurity:     Worry: Not on file     Inability: Not on file    Transportation needs:     Medical: Not on file     Non-medical: Not on file   Tobacco Use    Smoking status: Never Smoker    Smokeless tobacco: Never Used   Substance and Sexual Activity    Alcohol use: No    Drug use: Never    Sexual activity: Not Currently   Lifestyle    Physical activity:     Days per week: Not on file     Minutes per session: Not on file    Stress: Not on file   Relationships    Social connections:     Talks on phone: Not on file     Gets together: Not on file     Attends Zoroastrianism service: Not on file     Active member of club or organization: Not on file     Attends meetings of clubs or organizations: Not on file     Relationship status: Not on file    Intimate partner violence:     Fear of current or ex partner: Not on file     Emotionally abused: Not on file     Physically abused: Not on file     Forced sexual activity: Not on file   Other Topics Concern    Not on file   Social History Narrative    Not on file        Allergies Allergen Reactions    Codeine Nausea and Vomiting and Other (comments)     Dizzy, lightheadedness; SHAKING , SEIZURE LIKE SYMPTOMS           Current Outpatient Medications   Medication Sig Dispense Refill    insulin glargine (LANTUS,BASAGLAR) 100 unit/mL (3 mL) inpn Increase to 12 units daily. Indications: type 2 diabetes mellitus 5 Pen 0    ondansetron hcl (ZOFRAN) 4 mg tablet Take 1 Tab by mouth every eight (8) hours as needed for Nausea. 21 Tab 1    amitriptyline (ELAVIL) 10 mg tablet Take 1 Tab by mouth nightly for 30 days. 30 Tab 2    metFORMIN ER (GLUCOPHAGE XR) 500 mg tablet Take 1 Tab by mouth daily (with dinner). 30 Tab 5    cyclobenzaprine (FLEXERIL) 10 mg tablet Take 1 Tab by mouth nightly. 30 Tab 1    albuterol (VENTOLIN HFA) 90 mcg/actuation inhaler Ventolin HFA 90 mcg/actuation aerosol inhaler      losartan (COZAAR) 100 mg tablet Take 100 mg by mouth daily.  amLODIPine (NORVASC) 10 mg tablet Take 10 mg by mouth daily. Physical Exam   Constitutional: No distress. HENT:   Head: Normocephalic and atraumatic. Eyes: Pupils are equal, round, and reactive to light. Conjunctivae and EOM are normal.   Neck:   Left side neck tenderness; with limitation of movement when turning to the left. Cardiovascular: Normal rate, regular rhythm and normal heart sounds. No murmur heard. Pulmonary/Chest: Effort normal and breath sounds normal. No respiratory distress. Musculoskeletal: She exhibits no edema or deformity. Left side neck tenderness; limited range of motion to the left side; limited shoulder movement. Limited range of left shoulder movement. Neurological:   Mental status: Awake, alert, oriented x3, follows simple, complex and inverted commands, no neglect, no extinction to DSS or VSS.   Speech and languge: fluent, coherent, and comprehension intact  CN: Fundoscopy: clear disc margins; VFF, EOMI, PERRLA, face sensation intact , no facial asymmetry noted, palate elevation symmetric bilat, SS+SCM 5/5 bilat, tongue midline. Motor: no pronator drift, tone normal throughout, strength 5/5 throughout  Sensory: intact to light touch, PP, and position sense throughout  Coordination: FNF, HS accurate w/o dysmetria; some difficulty with tandem walking. DTR: 2+ throughout, toes downgoing BL  Gait: normal; some difficulty with tandem walking. Skin: She is not diaphoretic. Skin: She is not diaphoretic. Office Visit on 10/01/2019   Component Date Value Ref Range Status    Glucose POC 10/01/2019 206  mg/dL Final   Hospital Outpatient Visit on 09/18/2019   Component Date Value Ref Range Status    Sodium 09/18/2019 136  136 - 145 mmol/L Final    Potassium 09/18/2019 4.4  3.5 - 5.5 mmol/L Final    Chloride 09/18/2019 104  100 - 111 mmol/L Final    CO2 09/18/2019 28  21 - 32 mmol/L Final    Anion gap 09/18/2019 4  3.0 - 18 mmol/L Final    Glucose 09/18/2019 245* 74 - 99 mg/dL Final    BUN 09/18/2019 11  7.0 - 18 MG/DL Final    Creatinine 09/18/2019 0.90  0.6 - 1.3 MG/DL Final    BUN/Creatinine ratio 09/18/2019 12  12 - 20   Final    GFR est AA 09/18/2019 >60  >60 ml/min/1.73m2 Final    GFR est non-AA 09/18/2019 >60  >60 ml/min/1.73m2 Final    Comment: (NOTE)  Estimated GFR is calculated using the Modification of Diet in Renal   Disease (MDRD) Study equation, reported for both  Americans   (GFRAA) and non- Americans (GFRNA), and normalized to 1.73m2   body surface area. The physician must decide which value applies to   the patient. The MDRD study equation should only be used in   individuals age 25 or older. It has not been validated for the   following: pregnant women, patients with serious comorbid conditions,   or on certain medications, or persons with extremes of body size,   muscle mass, or nutritional status.       Calcium 09/18/2019 10.3* 8.5 - 10.1 MG/DL Final    Bilirubin, total 09/18/2019 0.4  0.2 - 1.0 MG/DL Final    ALT (SGPT) 09/18/2019 103* 13 - 56 U/L Final    AST (SGOT) 09/18/2019 40* 10 - 38 U/L Final    Alk. phosphatase 09/18/2019 142* 45 - 117 U/L Final    Protein, total 09/18/2019 7.3  6.4 - 8.2 g/dL Final    Albumin 09/18/2019 4.2  3.4 - 5.0 g/dL Final    Globulin 09/18/2019 3.1  2.0 - 4.0 g/dL Final    A-G Ratio 09/18/2019 1.4  0.8 - 1.7   Final    LIPID PROFILE 09/18/2019        Final    Cholesterol, total 09/18/2019 123  <200 MG/DL Final    Triglyceride 09/18/2019 71  <150 MG/DL Final    Comment: The drugs N-acetylcysteine (NAC) and  Metamiszole have been found to cause falsely  low results in this chemical assay. Please  be sure to submit blood samples obtained  BEFORE administration of either of these  drugs to assure correct results.  HDL Cholesterol 09/18/2019 35* 40 - 60 MG/DL Final    LDL, calculated 09/18/2019 73.8  0 - 100 MG/DL Final    VLDL, calculated 09/18/2019 14.2  MG/DL Final    CHOL/HDL Ratio 09/18/2019 3.5  0 - 5.0   Final    Hemoglobin A1c 09/18/2019 11.1* 4.2 - 5.6 % Final    Comment: (NOTE)  HbA1C Interpretive Ranges  <5.7              Normal  5.7 - 6.4         Consider Prediabetes  >6.5              Consider Diabetes      Est. average glucose 09/18/2019 272  mg/dL Final    Comment: (NOTE)  The eAG should be interpreted with patient characteristics in mind   since ethnicity, interindividual differences, red cell lifespan,   variation in rates of glycation, etc. may affect the validity of the   calculation.       TSH 09/18/2019 1.69  0.36 - 3.74 uIU/mL Final    WBC 09/18/2019 6.5  4.6 - 13.2 K/uL Final    RBC 09/18/2019 4.60  4.20 - 5.30 M/uL Final    HGB 09/18/2019 12.7  12.0 - 16.0 g/dL Final    HCT 09/18/2019 38.2  35.0 - 45.0 % Final    MCV 09/18/2019 83.0  74.0 - 97.0 FL Final    MCH 09/18/2019 27.6  24.0 - 34.0 PG Final    MCHC 09/18/2019 33.2  31.0 - 37.0 g/dL Final    RDW 09/18/2019 13.7  11.6 - 14.5 % Final    PLATELET 31/00/6840 786  135 - 420 K/uL Final    MPV 09/18/2019 11.0  9.2 - 11.8 FL Final    NEUTROPHILS 09/18/2019 46  40 - 73 % Final    LYMPHOCYTES 09/18/2019 44  21 - 52 % Final    MONOCYTES 09/18/2019 6  3 - 10 % Final    EOSINOPHILS 09/18/2019 4  0 - 5 % Final    BASOPHILS 09/18/2019 0  0 - 2 % Final    ABS. NEUTROPHILS 09/18/2019 3.0  1.8 - 8.0 K/UL Final    ABS. LYMPHOCYTES 09/18/2019 2.8  0.9 - 3.6 K/UL Final    ABS. MONOCYTES 09/18/2019 0.4  0.05 - 1.2 K/UL Final    ABS. EOSINOPHILS 09/18/2019 0.2  0.0 - 0.4 K/UL Final    ABS. BASOPHILS 09/18/2019 0.0  0.0 - 0.1 K/UL Final    DF 09/18/2019 AUTOMATED    Final    Sed rate, automated 09/18/2019 11  0 - 20 mm/hr Final             ICD-10-CM ICD-9-CM    1. Cervical radiculopathy M54.12 723.4 MRI CERV SPINE WO CONT   2. Cervicogenic headache R51 784.0      Mrs. Bala Mcconnell came for follow-up of her headache. She claims that she has worsening of her left-sided neck pain which radiates to the left upper extremity with tingling sensation. No weakness. Headache is about the same. Some blurring of vision. Has recent lumbar puncture but the result was not available for review. MRI of the brain was normal except for a partial empty sella; this could signify pseudotumor. Will need to get the lumbar puncture results and the opening pressure. The neck pain with a headache is suggestive of cervicogenic headache. She also has cervical radiculopathy. Plan:  -Continue with Voltaren 100 mg twice a day  -Continue with the lidocaine patch  -MRI of the neck  -Requested lumbar puncture result from [facility]  -We will decide on adding Diamox based on the lumbar puncture results  -we will see her in 3 months time.

## 2019-10-15 NOTE — PROGRESS NOTES
Willy Cervantes is a 47 y.o. female in today for follow-up on visit to [facility] ED for head and neck pain, nausea and dizziness. Learning assessment previously completed 9/18/2019; primary language is [place].

## 2019-10-16 ENCOUNTER — OFFICE VISIT (OUTPATIENT)
Dept: FAMILY MEDICINE CLINIC | Age: 49
End: 2019-10-16

## 2019-10-16 VITALS
HEART RATE: 76 BPM | WEIGHT: 240 LBS | BODY MASS INDEX: 37.67 KG/M2 | RESPIRATION RATE: 20 BRPM | OXYGEN SATURATION: 97 % | DIASTOLIC BLOOD PRESSURE: 112 MMHG | HEIGHT: 67 IN | TEMPERATURE: 98.2 F | SYSTOLIC BLOOD PRESSURE: 160 MMHG

## 2019-10-16 DIAGNOSIS — E11.9 TYPE 2 DIABETES MELLITUS WITHOUT COMPLICATION, WITH LONG-TERM CURRENT USE OF INSULIN (HCC): ICD-10-CM

## 2019-10-16 DIAGNOSIS — M54.2 NECK PAIN: ICD-10-CM

## 2019-10-16 DIAGNOSIS — Z00.00 HEALTHCARE MAINTENANCE: Primary | ICD-10-CM

## 2019-10-16 DIAGNOSIS — Z79.4 TYPE 2 DIABETES MELLITUS WITHOUT COMPLICATION, WITH LONG-TERM CURRENT USE OF INSULIN (HCC): ICD-10-CM

## 2019-10-16 LAB — GLUCOSE POC: 305 MG/DL

## 2019-10-16 RX ORDER — LIDOCAINE 50 MG/G
PATCH TOPICAL
COMMUNITY
Start: 2019-10-14 | End: 2020-01-03

## 2019-10-16 RX ORDER — METHOCARBAMOL 750 MG/1
TABLET, FILM COATED ORAL
Refills: 0 | COMMUNITY
Start: 2019-10-11 | End: 2020-01-03

## 2019-10-16 RX ORDER — INSULIN LISPRO 100 [IU]/ML
INJECTION, SOLUTION INTRAVENOUS; SUBCUTANEOUS
Qty: 1 PACKAGE | Refills: 0 | Status: SHIPPED | COMMUNITY
Start: 2019-10-16 | End: 2021-10-15 | Stop reason: SDUPTHER

## 2019-10-16 RX ORDER — PREDNISONE 10 MG/1
TABLET ORAL
Qty: 10 TAB | Refills: 0 | Status: SHIPPED | OUTPATIENT
Start: 2019-10-16 | End: 2019-10-21 | Stop reason: SDUPTHER

## 2019-10-16 NOTE — PROGRESS NOTES
Chief Complaint   [facility] with   [facility]     1. Have you been to the ER, urgent care clinic since your last visit? Hospitalized since your last visit? Yes When: 10/14/19- left james and arm    2. Have you seen or consulted any other health care providers outside of the [address] [place] [address] since your last visit? Include any pap smears or colon screening.  No

## 2019-10-16 NOTE — PROGRESS NOTES
Discharge instructions reviewed with patient    Medication list and understanding of medications reviewed with patient. OTC and herbal medications reviewed and added to med list if applicable  Barriers to adherence assessed. Guidance given regarding new medications this visit, including reason for taking this medicine, and common side effects. I contacted the [address] [place] [address] Dept for the patient as she made her appt for her neck scan. Scan is scheduled for 10/17/2019 at 6:15.

## 2019-10-16 NOTE — PATIENT INSTRUCTIONS
63 Ar[address] Pagan Scheduling appt for a neck scan scheduled for 10/17/2019 at 6:15. Sample medication given and noted.

## 2019-10-16 NOTE — PROGRESS NOTES
JEAN CARLOS  Zan Reid is a 47 y.o. female being seen today for   Chief Complaint   Patient presents with   [facility] Follow Up     Pain in left shoulder/arm    follow up for this pt with diabetes and headache, blurry vision and new left neck and arm pain. Neck pain is severe and her major complaint today. Radiates down left arm with pins and needles. She saw neurology yesterday who started her on amitripytline for her neck pain as well as lidocaine patches. .she states that her fatigue is a little better since her blood sugars are improved but most of the other symptoms are about the same. Today what bothers her the most is her neck and arm. She has torn her rotator cuff on that side but that was years ago. Past Medical History:   Diagnosis Date    Asthma     Bronchitis     Hiatal hernia 2013    Hypertension     Knee deformity, acquired 1996    left knee    Pancreatitis 2005         ROS  Patient states that she is feeling well. Denies complaints of chest pain, shortness of breath, swelling of legs, dizziness or weakness. she denies nausea, vomiting or diarrhea. Current Outpatient Medications   Medication Sig    lidocaine (LIDODERM) 5 % Apply 1 patch as directed for 12 hours every 24 hours (12 hours on, 12 hours off)    predniSONE (DELTASONE) 10 mg tablet 4 po daily x one day, 3 po daily for one day, 2 po daily x one day, one po daily x one day    insulin lispro (HUMALOG KWIKPEN INSULIN) 100 unit/mL kwikpen Sliding scale tid prn    amitriptyline (ELAVIL) 25 mg tablet Take 1 Tab by mouth nightly for 30 days.  insulin glargine (LANTUS,BASAGLAR) 100 unit/mL (3 mL) inpn Increase to 12 units daily. Indications: type 2 diabetes mellitus    ondansetron hcl (ZOFRAN) 4 mg tablet Take 1 Tab by mouth every eight (8) hours as needed for Nausea.  metFORMIN ER (GLUCOPHAGE XR) 500 mg tablet Take 1 Tab by mouth daily (with dinner).     cyclobenzaprine (FLEXERIL) 10 mg tablet Take 1 Tab by mouth nightly.  losartan (COZAAR) 100 mg tablet Take 100 mg by mouth daily.  amLODIPine (NORVASC) 10 mg tablet Take 10 mg by mouth daily.  methocarbamol (ROBAXIN) 750 mg tablet TAKE 1 TABLET BY MOUTH EVERY 4 HOURS AS NEEDED FOR 10 DAYS    albuterol (VENTOLIN HFA) 90 mcg/actuation inhaler Ventolin HFA 90 mcg/actuation aerosol inhaler     No current facility-administered medications for this visit. PE  Visit Vitals  BP (!) 160/112 (BP 1 Location: Right arm, BP Patient Position: Sitting)   Pulse 76   Temp 98.2 °F (36.8 °C) (Oral)   Resp 20   Ht 5' 7\" (1.702 m)   Wt 240 lb (108.9 kg)   LMP 01/24/2014 (LMP Unknown)   SpO2 97%   BMI 37.59 kg/m²        Alert and oriented with normal mood and affect. she is well developed and well nourished . Lungs are clear without wheezing. Heart rate is regular without murmurs or gallops. There is no lower extremity edema. Limited ROM neck. Does have increased pain with arm motion. Results for orders placed or performed in visit on 10/16/19   AMB POC GLUCOSE BLOOD, BY GLUCOSE MONITORING DEVICE   Result Value Ref Range    Glucose  mg/dL         Assessment and Plan:        ICD-10-CM ICD-9-CM    1. Healthcare maintenance Z00.00 V70.0 AMB POC GLUCOSE BLOOD, BY GLUCOSE MONITORING DEVICE   2. Neck pain M54.2 723.1    3. Type 2 diabetes mellitus without complication, with long-term current use of insulin ([facility]) E11.9 250.00     Z79.4 V58.67      Prednisone taper for neck pain/likely pinched cervical nerve  MRI pending of neck per neuro  Will provide with sliding scale insulin as prednisone will increase blood sugars temporarilly.        Kaylah Maria MD

## 2019-10-17 DIAGNOSIS — M54.12 CERVICAL RADICULOPATHY: Primary | ICD-10-CM

## 2019-10-17 RX ORDER — LORAZEPAM 2 MG/1
2 TABLET ORAL ONCE
Qty: 1 TAB | Refills: 0 | Status: SHIPPED | OUTPATIENT
Start: 2019-10-17 | End: 2019-10-17

## 2019-10-18 ENCOUNTER — PATIENT OUTREACH (OUTPATIENT)
Dept: FAMILY MEDICINE CLINIC | Age: 49
End: 2019-10-18

## 2019-10-18 ENCOUNTER — TELEPHONE (OUTPATIENT)
Dept: NEUROLOGY | Age: 49
End: 2019-10-18

## 2019-10-18 ENCOUNTER — HOSPITAL ENCOUNTER (OUTPATIENT)
Dept: MRI IMAGING | Age: 49
Discharge: HOME | End: 2019-10-18
Attending: STUDENT IN AN ORGANIZED HEALTH CARE EDUCATION/TRAINING PROGRAM
Payer: SELF-PAY

## 2019-10-18 DIAGNOSIS — M54.12 CERVICAL RADICULOPATHY: ICD-10-CM

## 2019-10-18 PROBLEM — Z79.4 TYPE 2 DIABETES MELLITUS WITHOUT COMPLICATION, WITH LONG-TERM CURRENT USE OF INSULIN (HCC): Status: ACTIVE | Noted: 2019-10-18

## 2019-10-18 PROBLEM — E11.9 TYPE 2 DIABETES MELLITUS WITHOUT COMPLICATION, WITH LONG-TERM CURRENT USE OF INSULIN (HCC): Status: ACTIVE | Noted: 2019-10-18

## 2019-10-18 PROCEDURE — 72141 MRI NECK SPINE W/O DYE: CPT

## 2019-10-18 RX ORDER — DIAZEPAM 5 MG/1
5 TABLET ORAL
COMMUNITY
Start: 2019-10-11 | End: 2019-10-21

## 2019-10-18 RX ORDER — KETOROLAC TROMETHAMINE 10 MG/1
10 TABLET, FILM COATED ORAL
Refills: 0 | COMMUNITY
Start: 2019-10-14 | End: 2020-01-03

## 2019-10-18 RX ORDER — DICLOFENAC SODIUM 100 MG/1
100 TABLET, FILM COATED, EXTENDED RELEASE ORAL 2 TIMES DAILY
Refills: 0 | COMMUNITY
Start: 2019-10-11 | End: 2020-01-03

## 2019-10-18 NOTE — PROGRESS NOTES
Contacted patient to follow up concerning neck/shoulder pain and diabetes. Patient has been to [facility] ED for neck/shoulder pain 3 times since last contact. Patient seen by PCP and prescribed prednisone but patient not started pred. BG last night was 282 so patient took 14 units Lantus. Per provider, advised her not to take Toradol or Valium. Advised she should be taking prednisone and Robaxin, as well as diclofenac if needed. Patient expressed understanding. She has MRI scheduled for this evening and will be taking Valium for claustrophobia. Will follow up with patient next week.

## 2019-10-21 ENCOUNTER — OFFICE VISIT (OUTPATIENT)
Dept: FAMILY MEDICINE CLINIC | Age: 49
End: 2019-10-21

## 2019-10-21 VITALS
TEMPERATURE: 96.9 F | HEIGHT: 67 IN | HEART RATE: 78 BPM | SYSTOLIC BLOOD PRESSURE: 145 MMHG | DIASTOLIC BLOOD PRESSURE: 110 MMHG | WEIGHT: 236 LBS | OXYGEN SATURATION: 99 % | BODY MASS INDEX: 37.04 KG/M2 | RESPIRATION RATE: 19 BRPM

## 2019-10-21 DIAGNOSIS — M25.512 LEFT SHOULDER PAIN, UNSPECIFIED CHRONICITY: ICD-10-CM

## 2019-10-21 DIAGNOSIS — M54.2 NECK PAIN: ICD-10-CM

## 2019-10-21 DIAGNOSIS — Z00.00 HEALTHCARE MAINTENANCE: Primary | ICD-10-CM

## 2019-10-21 LAB — GLUCOSE POC: 181 MG/DL

## 2019-10-21 RX ORDER — PREDNISONE 10 MG/1
TABLET ORAL
Qty: 10 TAB | Refills: 0 | Status: SHIPPED | OUTPATIENT
Start: 2019-10-21 | End: 2020-01-03

## 2019-10-21 NOTE — PATIENT INSTRUCTIONS
The doctor would like you to complete your mammogram and pap screening exams. You may contact [facility] for a  Free Mammogram and Pap Smear. To schedule an appointment call  [phone] for [facility] and [facility] [place] for [place] and [place]-----[phone] for [address] [place] [address] and you will be referred to the [facility] [employer]  nearest you.     At your appointment provide the  [address] [place] [address] as your primary care physician and they will forward your test results for inclusion in your medical records

## 2019-10-21 NOTE — PROGRESS NOTES
Chief Complaint   Patient presents with    Shoulder Pain     left shoulder pain - MRI follow up results     1. Have you been to the ER, urgent care clinic since your last visit? Hospitalized since your last visit? No    2. Have you seen or consulted any other health care providers outside of the [address] [place] [address] since your last visit? Include any pap smears or colon screening.  No        PATIENT REQUESTING Diabetic Nutrition class Information

## 2019-10-21 NOTE — PROGRESS NOTES
HISTORY OF PRESENT ILLNESS  Meredith Zabala is a 47 y.o. female here for shoulder pain. HPI Ms. Mccarthy's neck and left shoulder pain was relieved with the Prednisone and would like another taper dose. She cannot afford the Lidocaine patches and the muscle relaxers make her very drowsy. She had her MRI of her head, neck, shoulders done 2 days ago. When she called Neuro this morning, she was told the MRI will be read in the next couple of days and a F/U appt will be made for the \"next step\". She is due for a mammogram. Non-smoker. Review of Systems   Constitutional: Negative. Respiratory: Negative. Cardiovascular: Negative. Gastrointestinal: Negative. Genitourinary: Negative. Musculoskeletal: Positive for joint pain and neck pain. Negative for falls. Skin: Negative. Neurological: Negative for dizziness, tremors and speech change. Cardiac S1S2, no mumurs, intact pulses  Pulm RRR, no wheezes  Musculoskeletal limited left shoulder and neck ROM, no edema/erythema, equal hand     ASSESSMENT and PLAN    ICD-10-CM ICD-9-CM    1. Healthcare maintenance Z00.00 V70.0 AMB POC GLUCOSE BLOOD, BY GLUCOSE MONITORING DEVICE   2. Neck pain M54.2 723.1    3.  Left shoulder pain, unspecified chronicity M25.512 719.41    Prednisone taper x 4 days  Call Neuro tomorrow for appt    [facility] for screening mammogram

## 2019-10-23 DIAGNOSIS — M54.12 CERVICAL RADICULOPATHY: Primary | ICD-10-CM

## 2019-10-23 RX ORDER — PREDNISONE 10 MG/1
TABLET ORAL
Qty: 10 TAB | Refills: 0 | OUTPATIENT
Start: 2019-10-23

## 2019-10-28 ENCOUNTER — TELEPHONE (OUTPATIENT)
Dept: FAMILY MEDICINE CLINIC | Age: 49
End: 2019-10-28

## 2019-10-30 ENCOUNTER — PATIENT OUTREACH (OUTPATIENT)
Dept: FAMILY MEDICINE CLINIC | Age: 49
End: 2019-10-30

## 2019-10-30 NOTE — PROGRESS NOTES
Follow up Chronic Condition    Focus: Diabetes    Patient reports her BG's continue to be elevated, high 100's-200's, despite diet modification and medication. She is reading food labels and not eating anything above 5-6g sugar. Reports fatigue isn't gone but is better. She has started trying to be more active throughout the day by getting up more and moving around the house, and parking further out from stores. She got mail last sent but has not reviewed it yet. Reports her neck still hurts but is feeling better since taking prednisone and using the Lidoderm patches. She has been applying a Lidoderm patch every 12 hours. Advised pt should leave Lidoderm patch on for 12 hours and off for 12 hours. Advised patient to keep a food diary for the next week. Will follow up with patient in 1 week. Goals      Patient will add protein to her breakfast and eat protein anytime she has carbs.  Patient will get more steps in throughout her day by parking further out and moving more while at home. (pt-stated)      Patient will keep a food diary to help identify foods affecting her BG. PCP/Specialist follow up:   Future Appointments   Date Time Provider Pranav Bradley   11/8/2019  9:30 AM Homero Castleman,  [address]   12/17/2019  9:00 AM Nayla Edward  [address]        Patient has my number if questions arise.

## 2019-11-06 ENCOUNTER — PATIENT OUTREACH (OUTPATIENT)
Dept: FAMILY MEDICINE CLINIC | Age: 49
End: 2019-11-06

## 2019-11-06 NOTE — PROGRESS NOTES
Follow up Chronic Condition    Focus: Diabetes    Patient reports pain and bleeding at injection sites now. She is rotating injection sites throughout her abdomen. She has started using stevia in place of splenda and it is working for her. She is watching her bread intake; states if she eats bread, it is only 1 slice/day and she is eating whole wheat. She has noticed when she uses the insulin, BG's are low to mid 100's; without insulin BG is in the 200's. She is keeping a log of BG's and food. She is not currently at home. Asks I call back 11/8 to discuss logs. Advised pt to leave insulin out of fridge for 15 min to allow to warm to room temp before using. Will follow up with pt on 11/8. Patient has my number if questions arise.

## 2019-11-08 ENCOUNTER — PATIENT OUTREACH (OUTPATIENT)
Dept: FAMILY MEDICINE CLINIC | Age: 49
End: 2019-11-08

## 2019-11-08 ENCOUNTER — OFFICE VISIT (OUTPATIENT)
Dept: ORTHOPEDIC SURGERY | Age: 49
End: 2019-11-08

## 2019-11-08 VITALS
BODY MASS INDEX: 37.24 KG/M2 | HEART RATE: 72 BPM | DIASTOLIC BLOOD PRESSURE: 108 MMHG | OXYGEN SATURATION: 99 % | RESPIRATION RATE: 18 BRPM | SYSTOLIC BLOOD PRESSURE: 171 MMHG | WEIGHT: 237.8 LBS

## 2019-11-08 DIAGNOSIS — M54.50 ACUTE BILATERAL LOW BACK PAIN WITHOUT SCIATICA: ICD-10-CM

## 2019-11-08 DIAGNOSIS — M54.12 CERVICAL RADICULOPATHY: Primary | ICD-10-CM

## 2019-11-08 RX ORDER — CYCLOBENZAPRINE HCL 10 MG
10 TABLET ORAL
Qty: 60 TAB | Refills: 1 | Status: SHIPPED | OUTPATIENT
Start: 2019-11-08 | End: 2020-01-03

## 2019-11-08 RX ORDER — NAPROXEN 500 MG/1
500 TABLET ORAL 2 TIMES DAILY WITH MEALS
Qty: 60 TAB | Refills: 1 | Status: SHIPPED | OUTPATIENT
Start: 2019-11-08

## 2019-11-08 NOTE — PROGRESS NOTES
[address]  [address], [address]  [place], [address] [place] [address] Street  Phone: [phone]  Fax: [phone]  INITIAL CONSULTATION  Patient: Kary Stewart                MRN: [id number]       SSN: [id number]  YOB: 1970        AGE: 47 y.o. SEX: female  Body mass index is 37.24 kg/m². PCP: Tyler Best MD  11/08/19    Chief Complaint   Patient presents with    Neck Pain     SC         HISTORY OF PRESENT ILLNESS, RADIOGRAPHS, and PLAN:         HISTORY OF PRESENT ILLNESS:  Ms. Lizzeth Knutson is seen today at the request of Dr. Orlin Reyes and  ____________. Ms. Lizzeth Knutson is a 50-year-old female, insulin-dependent diabetic recently diagnosed. She had a recent onset in the past few weeks of severe neck pain with left-sided radicular symptoms, initially, primarily trapezius spasm, which she had cramping in her hand and arm and also some lower back pain. Before that, she was having some balance issues. 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 That is who order the MRI of the neck initially. A cervical MRI was done. It demonstrates minimal canal stenosis, disc protuberances on the left causing foraminal stenosis at C3-4 on the left and C4-5 on the left. There is no real cord compression or signal change. The pain has since moderated. On diagnosis of her diabetes four weeks ago, her A1C was 11 indicating significantly elevated blood sugars. Her pain is now down to a 6/10 where it was a 9.5/10, as she described it. She is much more comfortable, and the radicular pain has all resolved. It is now an axial neck pain. ASSESSMENT/PLAN: I discussed the matter at length with her. Hopefully, her pain will generally continue to improve. Most discogenic syndromes resolve themselves in a 6-12 week period. I do not see any objective neurology on her exam.  She has good strength and sensation.   We will get her home exercises, some nonsteroidals, and a mild muscle relaxer and see her back in several weeks to monitor her progress. Certainly, her labile elevated blood sugars made her more susceptible to the cervical radiculopathy. I cannot speak to the other neurologic syndromes she was being evaluated and treated for. The degree of cervical stenosis does not seem to be able to cause any myelopathic condition. We will keep an eye on things. cc: Jitendra Lemons M.D. Past Medical History:   Diagnosis Date    Asthma     Bronchitis     Hiatal hernia 2013    Hypertension     Knee deformity, acquired 1996    left knee    Pancreatitis 2005       Family History   Problem Relation Age of Onset    Lupus Mother     Heart Disease Father     No Known Problems Sister     Lupus Brother     No Known Problems Sister        Current Outpatient Medications   Medication Sig Dispense Refill    diclofenac (VOLTAREN XR) 100 mg ER tablet Take 100 mg by mouth two (2) times a day.  0    lidocaine (LIDODERM) 5 % Apply 1 patch as directed for 12 hours every 24 hours (12 hours on, 12 hours off)      insulin lispro (HUMALOG KWIKPEN INSULIN) 100 unit/mL kwikpen Sliding scale tid prn 1 Package 0    amitriptyline (ELAVIL) 25 mg tablet Take 1 Tab by mouth nightly for 30 days. 30 Tab 3    insulin glargine (LANTUS,BASAGLAR) 100 unit/mL (3 mL) inpn Increase to 12 units daily. Indications: type 2 diabetes mellitus 5 Pen 0    metFORMIN ER (GLUCOPHAGE XR) 500 mg tablet Take 1 Tab by mouth daily (with dinner). 30 Tab 5    cyclobenzaprine (FLEXERIL) 10 mg tablet Take 1 Tab by mouth nightly. 30 Tab 1    albuterol (VENTOLIN HFA) 90 mcg/actuation inhaler Ventolin HFA 90 mcg/actuation aerosol inhaler      losartan (COZAAR) 100 mg tablet Take 100 mg by mouth daily.  amLODIPine (NORVASC) 10 mg tablet Take 10 mg by mouth daily.       predniSONE (DELTASONE) 10 mg tablet 4 po daily x one day, 3 po daily for one day, 2 po daily x one day, one po daily x one day 10 Tab 0    ketorolac (TORADOL) 10 mg tablet Take 10 mg by mouth every eight (8) hours as needed for Pain.  0    methocarbamol (ROBAXIN) 750 mg tablet TAKE 1 TABLET BY MOUTH EVERY 4 HOURS AS NEEDED FOR 10 DAYS  0    ondansetron hcl (ZOFRAN) 4 mg tablet Take 1 Tab by mouth every eight (8) hours as needed for Nausea.  21 Tab 1       Allergies   Allergen Reactions    Codeine Nausea and Vomiting and Other (comments)     Dizzy, lightheadedness; SHAKING , SEIZURE LIKE SYMPTOMS         Past Surgical History:   Procedure Laterality Date    HX CHOLECYSTECTOMY  2001    lap    HX HERNIA REPAIR      2012 and 2013 by Dr Michael Marques    knee surgery       Past Medical History:   Diagnosis Date    Asthma     Bronchitis     Hiatal hernia 2013    Hypertension     Knee deformity, acquired 1996    left knee    Pancreatitis 2005       Social History     Socioeconomic History    Marital status: UNKNOWN     Spouse name: Not on file    Number of children: Not on file    Years of education: Not on file    Highest education level: Not on file   Occupational History    Not on file   Social Needs    Financial resource strain: Not on file    Food insecurity:     Worry: Not on file     Inability: Not on file    Transportation needs:     Medical: Not on file     Non-medical: Not on file   Tobacco Use    Smoking status: Never Smoker    Smokeless tobacco: Never Used   Substance and Sexual Activity    Alcohol use: No    Drug use: Never    Sexual activity: Not Currently   Lifestyle    Physical activity:     Days per week: Not on file     Minutes per session: Not on file    Stress: Not on file   Relationships    Social connections:     Talks on phone: Not on file     Gets together: Not on file     Attends [employer] service: Not on file     Active member of club or organization: Not on file     Attends meetings of clubs or organizations: Not on file     Relationship status: Not on file    Intimate partner violence:     Fear of current or ex partner: Not on file     Emotionally abused: Not on file     Physically abused: Not on file     Forced sexual activity: Not on file   Other Topics Concern    Not on file   Social History Narrative    Not on file           REVIEW OF SYSTEMS:   CONSTITUTIONAL SYMPTOMS:  Negative. EYES:  Negative. EARS, NOSE, THROAT AND MOUTH:  Negative. CARDIOVASCULAR:  Negative. RESPIRATORY:  Negative. GENITOURINARY: Per HPI. GASTROINTESTINAL:  Per HPI. INTEGUMENTARY (SKIN AND/OR BREAST):  Negative. MUSCULOSKELETAL: Per HPI.   ENDOCRINE/RHEUMATOLOGIC:  Negative. NEUROLOGICAL:  Per HPI. HEMATOLOGIC/LYMPHATIC:  Negative. ALLERGIC/IMMUNOLOGIC:  Negative. PSYCHIATRIC:  Negative. PHYSICAL EXAMINATION:   Visit Vitals  BP (!) 171/108 (BP 1 Location: Left arm, BP Patient Position: Sitting)   Pulse 72   Resp 18   Wt 237 lb 12.8 oz (107.9 kg)   LMP 01/24/2014 (LMP Unknown)   SpO2 99%   BMI 37.24 kg/m²    PAIN SCALE: 6/10    CONSTITUTIONAL: The patient is in no apparent distress and is alert and oriented x 3. HEENT: Normocephalic. Hearing grossly intact. NECK: Supple and symmetric. no tenderness, or masses were felt. RESPIRATORY: No labored breathing. CARDIOVASCULAR: The carotid pulses were normal. Peripheral pulses were 2+. CHEST: Normal AP diameter and normal contour without any kyphoscoliosis. LYMPHATIC: No lymphadenopathy was appreciated in the neck, axillae or groin. SKIN:  Negative for scars, rashes, lesions, or ulcers on the right upper, right lower, left upper, left lower and trunk. NEUROLOGICAL: Alert and oriented x 3. Ambulation without assistive device. FWB. EXTREMITIES:  See musculoskeletal.  MUSCULOSKELETAL:   Head and Neck: Neck pain. Negative for misalignment, asymmetry, crepitation, defects, tenderness masses or effusions.  Left Upper Extremity: Paresthesias and Pain. Inspection, percussion and palpation performed. Saucedas sign is negative.  Right Upper Extremity: Paresthesias. Inspection, percussion and palpation performed. Saucedas sign is negative.  Spine, Ribs and Pelvis: Mid and low back pain. Inspection, percussion and palpation performed. Negative for misalignment, asymmetry, crepitation, defects, tenderness masses or effusions.  Left Lower Extremity: Inspection, percussion and palpation performed. Negative straight leg raise.  Right Lower Extremity: Inspection, percussion and palpation performed. Negative straight leg raise. SPINE EXAM:     Cervical spine: Neck is midline. Normal muscle tone. No focal atrophy is noted. Lumbar spine: No rash, ecchymosis, or gross obliquity. No focal atrophy is noted. ASSESSMENT    ICD-10-CM ICD-9-CM    1. Cervical radiculopathy M54.12 723.4 AMB SUPPLY ORDER      naproxen (NAPROSYN) 500 mg tablet      cyclobenzaprine (FLEXERIL) 10 mg tablet   2. Acute bilateral low back pain without sciatica M54.5 724.2      338.19        Written by Philippe Sanon, as dictated by Edy Mccloud MD.    I, Dr. Edy Mccloud MD, confirm that all documentation is accurate.

## 2019-11-08 NOTE — PATIENT INSTRUCTIONS
Neck: Exercises  Introduction  Here are some examples of exercises for you to try. The exercises may be suggested for a condition or for rehabilitation. Start each exercise slowly. Ease off the exercises if you start to have pain. You will be told when to start these exercises and which ones will work best for you. How to do the exercises  Neck stretch    1. This stretch works best if you keep your shoulder down as you lean away from it. To help you remember to do this, start by relaxing your shoulders and lightly holding on to your thighs or your chair. 2. Tilt your head toward your shoulder and hold for 15 to 30 seconds. Let the weight of your head stretch your muscles. 3. If you would like a little added stretch, use your hand to gently and steadily pull your head toward your shoulder. For example, keeping your right shoulder down, lean your head to the left. 4. Repeat 2 to 4 times toward each shoulder. Diagonal neck stretch    1. Turn your head slightly toward the direction you will be stretching, and tilt your head diagonally toward your chest and hold for 15 to 30 seconds. 2. If you would like a little added stretch, use your hand to gently and steadily pull your head forward on the diagonal.  3. Repeat 2 to 4 times toward each side. Dorsal glide stretch    1. Sit or stand tall and look straight ahead. 2. Slowly tuck your chin as you glide your head backward over your body  3. Hold for a count of 6, and then relax for up to 10 seconds. 4. Repeat 8 to 12 times. Chest and shoulder stretch    1. Sit or stand tall and glide your head backward as in the dorsal glide stretch. 2. Raise both arms so that your hands are next to your ears. 3. Take a deep breath, and as you breathe out, lower your elbows down and behind your back. You will feel your shoulder blades slide down and together, and at the same time you will feel a stretch across your chest and the front of your shoulders.   4. Hold for about 6 seconds, and then relax for up to 10 seconds. 5. Repeat 8 to 12 times. Strengthening: Hands on head    1. Move your head backward, forward, and side to side against gentle pressure from your hands, holding each position for about 6 seconds. 2. Repeat 8 to 12 times. Follow-up care is a key part of your treatment and safety. Be sure to make and go to all appointments, and call your doctor if you are having problems. It's also a good idea to know your test results and keep a list of the medicines you take. Where can you learn more? Go to http://dominik-mahin.info/. Enter P975 in the search box to learn more about \"Neck: Exercises. \"  Current as of: June 26, 2019  Content Version: 12.2  © 0228-1343 [employer]wise, [employer]orporated. Care instructions adapted under license by [employer] (which disclaims liability or warranty for this information). If you have questions about a medical condition or this instruction, always ask your healthcare professional. [address] any warranty or liability for your use of this information. Healthy Upper Back: Exercises  Introduction  Here are some examples of exercises for your upper back. Start each exercise slowly. Ease off the exercise if you start to have pain. Your doctor or physical therapist will tell you when you can start these exercises and which ones will work best for you. How to do the exercises  Lower neck and upper back stretch    1. Stretch your arms out in front of your body. Clasp one hand on top of your other hand. 2. Gently reach out so that you feel your shoulder blades stretching away from each other. 3. Gently bend your head forward. 4. Hold for 15 to 30 seconds. 5. Repeat 2 to 4 times. Midback stretch    1. Kneel on the floor, and sit back on your ankles. 2. Lean forward, place your hands on the floor, and stretch your arms out in front of you.  Rest your head between your arms.  3. Gently push your chest toward the floor, reaching as far in front of you as possible. 4. Hold for 15 to 30 seconds. 5. Repeat 2 to 4 times. Shoulder rolls    1. Sit comfortably with your feet shoulder-width apart. You can also do this exercise while standing. 2. Roll your shoulders up, then back, and then down in a smooth, circular motion. 3. Repeat 2 to 4 times. Wall push-up    1. Stand against a wall with your feet about 12 to 24 inches back from the wall. If you feel any pain when you do this exercise, stand closer to the wall. 2. Place your hands on the wall slightly wider apart than your shoulders, and lean forward. 3. Gently lean your body toward the wall. Then push back to your starting position. Keep the motion smooth and controlled. 4. Repeat 8 to 12 times. Resisted shoulder blade squeeze    1. Sit or stand, holding the band in both hands in front of you. Keep your elbows close to your sides, bent at a 90-degree angle. Your palms should face up. 2. Squeeze your shoulder blades together, and move your arms to the outside, stretching the band. Be sure to keep your elbows at your sides while you do this. 3. Relax. 4. Repeat 8 to 12 times. Resisted rows    1. Put the band around a solid object, such as a bedpost, at about waist level. Hold one end of the band in each hand. 2. With your elbows at your sides and bent to 90 degrees, pull the band back to move your shoulder blades toward each other. Return to the starting position. 3. Repeat 8 to 12 times. Follow-up care is a key part of your treatment and safety. Be sure to make and go to all appointments, and call your doctor if you are having problems. It's also a good idea to know your test results and keep a list of the medicines you take. Where can you learn more? Go to http://dominik-mahin.info/. Enter J330 in the search box to learn more about \"Healthy Upper Back: Exercises. \"  Current as of: June 26, 2019  Content Version: 12.2  © 5845-3524 [employer]. Care instructions adapted under license by [employer] (which disclaims liability or warranty for this information). If you have questions about a medical condition or this instruction, always ask your healthcare professional. [address] any warranty or liability for your use of this information.

## 2019-11-13 DIAGNOSIS — R51.9 HEADACHE DISORDER: Primary | ICD-10-CM

## 2019-11-14 NOTE — PROGRESS NOTES
Follow up Chronic Condition    Focus: Diabetes    Unable to reach patient by phone. Will try again next week. Patient has my number if questions arise.

## 2019-11-25 ENCOUNTER — PATIENT OUTREACH (OUTPATIENT)
Dept: FAMILY MEDICINE CLINIC | Age: 49
End: 2019-11-25

## 2019-11-25 NOTE — LETTER
11/26/2019 2:04 PM 
 
Ms. Garrett Edwards [id number] [facility], [address] [place] [address] Dear Garrett Eddylucero, It was a pleasure talking with you about your Diabetes today. As we discussed, I am mailing you a coupon for Glucerna, Dial a Dietitian, and some diabetes diet information. Please call me if you have any questions or concerns @ [phone].  
 
 
 
 
Sincerely, 
 
 
Judy Schilder, RN

## 2019-11-26 NOTE — PROGRESS NOTES
Follow up Chronic Condition    Focus: Diabetes     Patient reports she has been feeling much better; headaches and neck pain has improved. Neurology ordered a lumbar puncture and pt states she will call to schedule after the Thanksgiving holiday. Reports BG's ranging from 120's-190's, mostly below 160. She is working hard at [employer]. Rather than limiting certain \"trigger\" foods she is avoiding them all together. She states if she even has a little of specific foods, such as certain breads, she is unable to limit how much she will eat. Patient has complaint of belly fat and asking for recommendations how to get rid of it. She is using Lantus 14 units daily and not using Humalog. States she was not given a sliding scale with the Humalog; only to take 2-3 units with meals. Now that her BG is below 200 she would like more instruction on how to use Humalog. Patient drinks Glucerna and Premire protein shakes. Patient plans to attend DM classes at [facility]. Reviewed some diet information previously discussed as patient was previously in a lot of pain and on medication. Discussed carb counting. Discussed importance of lowering BG with diet to limit amount of insulin needed which will aide in weight loss. Discussed importance of exercise in weight loss and BG control. Patient has become more active through her day but will also start taking walks for exercise now that she is in less pain. Will consult provider concerning appropriate sliding scale for patient. Mailed to patient Ensure coupon, dial a dietitian. Follow up with patient in 2 weeks. Patient has my number if questions arise.

## 2019-11-27 ENCOUNTER — PATIENT OUTREACH (OUTPATIENT)
Dept: FAMILY MEDICINE CLINIC | Age: 49
End: 2019-11-27

## 2019-11-27 NOTE — PROGRESS NOTES
Per provider, called patient to provide sliding scale:    Less than 150-no insulin   150-200- 2 units   200-250-3 units   250-300- 4 units   300-350- 5 units (and recheck in an hour)   Over 350- 6 units (and recheck in an hour)     Patient reports she is currently out of town for the next 4 days and she forgot her Lantus. She does have her Humalog. Advised she check BG prior to every meal and use sliding scale. Also advised she follow up with PCP concerning her BP as it has been elevated at every office or hospital visit since October. PCP/Specialist follow up:   Future Appointments   Date Time Provider Pranav Mirna   12/2/2019 11:00 AM Kelton Ledbetter MD [address] [facility]   12/17/2019  9:00 AM Chelsey Cheney  [address]      Patient has my number if questions arise.

## 2019-12-02 ENCOUNTER — OFFICE VISIT (OUTPATIENT)
Dept: FAMILY MEDICINE CLINIC | Age: 49
End: 2019-12-02

## 2019-12-02 ENCOUNTER — HOSPITAL ENCOUNTER (OUTPATIENT)
Dept: LAB | Age: 49
Discharge: HOME | End: 2019-12-02

## 2019-12-02 VITALS
DIASTOLIC BLOOD PRESSURE: 85 MMHG | TEMPERATURE: 97.3 F | WEIGHT: 230 LBS | SYSTOLIC BLOOD PRESSURE: 132 MMHG | HEART RATE: 74 BPM | RESPIRATION RATE: 18 BRPM | BODY MASS INDEX: 36.1 KG/M2 | OXYGEN SATURATION: 97 % | HEIGHT: 67 IN

## 2019-12-02 DIAGNOSIS — E11.9 TYPE 2 DIABETES MELLITUS WITHOUT COMPLICATION, WITH LONG-TERM CURRENT USE OF INSULIN (HCC): ICD-10-CM

## 2019-12-02 DIAGNOSIS — R53.83 TIREDNESS: ICD-10-CM

## 2019-12-02 DIAGNOSIS — Z00.00 REGULAR CHECK-UP: Primary | ICD-10-CM

## 2019-12-02 DIAGNOSIS — Z00.00 REGULAR CHECK-UP: ICD-10-CM

## 2019-12-02 DIAGNOSIS — Z79.4 TYPE 2 DIABETES MELLITUS WITHOUT COMPLICATION, WITH LONG-TERM CURRENT USE OF INSULIN (HCC): ICD-10-CM

## 2019-12-02 DIAGNOSIS — Z12.11 SCREENING FOR COLON CANCER: ICD-10-CM

## 2019-12-02 DIAGNOSIS — Z00.00 HEALTHCARE MAINTENANCE: ICD-10-CM

## 2019-12-02 LAB
BASOPHILS # BLD: 0 K/UL (ref 0–0.1)
BASOPHILS NFR BLD: 0 % (ref 0–2)
DIFFERENTIAL METHOD BLD: NORMAL
EOSINOPHIL # BLD: 0.2 K/UL (ref 0–0.4)
EOSINOPHIL NFR BLD: 3 % (ref 0–5)
ERYTHROCYTE [DISTWIDTH] IN BLOOD BY AUTOMATED COUNT: 13.9 % (ref 11.6–14.5)
GLUCOSE POC: 169 MG/DL
HBA1C MFR BLD HPLC: 8.5 %
HCT VFR BLD AUTO: 38.8 % (ref 35–45)
HGB BLD-MCNC: 12.8 G/DL (ref 12–16)
IRON SATN MFR SERPL: 19 %
IRON SERPL-MCNC: 80 UG/DL (ref 50–175)
LYMPHOCYTES # BLD: 2.9 K/UL (ref 0.9–3.6)
LYMPHOCYTES NFR BLD: 46 % (ref 21–52)
MCH RBC QN AUTO: 28.2 PG (ref 24–34)
MCHC RBC AUTO-ENTMCNC: 33 G/DL (ref 31–37)
MCV RBC AUTO: 85.5 FL (ref 74–97)
MONOCYTES # BLD: 0.4 K/UL (ref 0.05–1.2)
MONOCYTES NFR BLD: 6 % (ref 3–10)
NEUTS SEG # BLD: 2.8 K/UL (ref 1.8–8)
NEUTS SEG NFR BLD: 45 % (ref 40–73)
PLATELET # BLD AUTO: 338 K/UL (ref 135–420)
PMV BLD AUTO: 10.5 FL (ref 9.2–11.8)
RBC # BLD AUTO: 4.54 M/UL (ref 4.2–5.3)
TIBC SERPL-MCNC: 411 UG/DL (ref 250–450)
WBC # BLD AUTO: 6.2 K/UL (ref 4.6–13.2)

## 2019-12-02 PROCEDURE — 85025 COMPLETE CBC W/AUTO DIFF WBC: CPT

## 2019-12-02 PROCEDURE — 83540 ASSAY OF IRON: CPT

## 2019-12-02 NOTE — PROGRESS NOTES
Discharge instructions reviewed with patient    Medication list and understanding of medications reviewed with patient. OTC and herbal medications reviewed and added to med list if applicable  Barriers to adherence assessed. Guidance given regarding new medications this visit, including reason for taking this medicine, and common side effects. Lab work drawn    [facility] given to patient instructions explained    AVS given to patient. Explained to patient. Patient expressed understanding.

## 2019-12-02 NOTE — PATIENT INSTRUCTIONS
The doctor would like you to complete your mammogram and pap screening exams. You may contact [facility] for a  Free Mammogram and Pap Smear. To schedule an appointment call  Rony Forde---[phone] for [facility] and [place], [address] [facility], and [address] [facility] for [facility]  ---Billey Showers ---[phone] for Elodia Cogan, Helon Ahr  you will be referred to the [facility]  nearest you. At your appointment provide the  [address] [place] [address] as your primary care physician and they will forward your test results for inclusion in your medical records

## 2019-12-10 ENCOUNTER — PATIENT OUTREACH (OUTPATIENT)
Dept: FAMILY MEDICINE CLINIC | Age: 49
End: 2019-12-10

## 2019-12-10 NOTE — PROGRESS NOTES
Follow up Chronic Condition    Focus: Diabetes    Patient reports BG's around 115. States she is not needing to use the Humalog at all. She has eliminated all grains and sweets from diet. She is attending DM classes at [facility] this month. Reports she has been moving more but not walking as much as she would like due to fatigue. She plans to get a gym membership soon. Reports she is off all pain medication and denies pain. Patient is very motivated to continue on her path to health. Prime motivation is expected grand baby. Goals      Patient will add protein to her breakfast and eat protein anytime she has carbs.  Patient will get more steps in throughout her day by parking further out and moving more while at home. (pt-stated)      12/10- Patient trying to be more active but having difficulty due to fatigue.  Patient will keep a food diary to help identify foods affecting her BG.      12/10- Patient has noted that eating sweets shoots her BG up too high so is avoidant. Encouraged exercise to increase her energy level. Discussed foot care and mailed to patient Diabetes Foot Health: Care Instructions. Follow up with patient after the holiday on 12/30. Patient has my number if questions arise.

## 2019-12-10 NOTE — LETTER
12/10/2019 3:51 PM 
 
Ms. Fabi De La Torre [id number] [place], [address] [place] [address] Dear Fabi De La Torre, It was a pleasure talking with you about your Diabetes today. I am happy to hear you are doing so well taking control of your Diabetes. As we discussed, here is some information about Diabetes and foot care. Please call me if you have any questions or concerns @ [phone].  
 
 
 
 
Sincerely, 
 
 
Raymundo Mcgregor RN

## 2019-12-10 NOTE — PATIENT INSTRUCTIONS
Diabetes Foot Health: Care Instructions Your Care Instructions When you have diabetes, your feet need extra care and attention. Diabetes can damage the nerve endings and blood vessels in your feet, making you less likely to notice when your feet are injured. Diabetes also limits your body's ability to fight infection and get blood to areas that need it. If you get a minor foot injury, it could become an ulcer or a serious infection. With good foot care, you can prevent most of these problems. Caring for your feet can be quick and easy. Most of the care can be done when you are bathing or getting ready for bed. Follow-up care is a key part of your treatment and safety. Be sure to make and go to all appointments, and call your doctor if you are having problems. It's also a good idea to know your test results and keep a list of the medicines you take. How can you care for yourself at home? · Keep your blood sugar close to normal by watching what and how much you eat, monitoring blood sugar, taking medicines if prescribed, and getting regular exercise. · Do not smoke. Smoking affects blood flow and can make foot problems worse. If you need help quitting, talk to your doctor about stop-smoking programs and medicines. These can increase your chances of quitting for good. · Eat a diet that is low in fats. High fat intake can cause fat to build up in your blood vessels and decrease blood flow. · Inspect your feet daily for blisters, cuts, cracks, or sores. If you cannot see well, use a mirror or have someone help you. · Take care of your feet: 
? Wash your feet every day. Use warm (not hot) water. Check the water temperature with your wrists or other part of your body, not your feet. ? Dry your feet well. Pat them dry. Do not rub the skin on your feet too hard. Dry well between your toes. If the skin on your feet stays moist, bacteria or a fungus can grow, which can lead to infection. ? Keep your skin soft. Use moisturizing skin cream to keep the skin on your feet soft and prevent calluses and cracks. But do not put the cream between your toes, and stop using any cream that causes a rash. ? Clean underneath your toenails carefully. Do not use a sharp object to clean underneath your toenails. Use the blunt end of a nail file or other rounded tool. ? Trim and file your toenails straight across to prevent ingrown toenails. Use a nail clipper, not scissors. Use an emery board to smooth the edges. · Change socks daily. Socks without seams are best, because seams often rub the feet. You can find socks for people with diabetes from specialty catalogs. · Look inside your shoes every day for things like gravel or torn linings, which could cause blisters or sores. · Buy shoes that fit well: 
? Look for shoes that have plenty of space around the toes. This helps prevent bunions and blisters. ? Try on shoes while wearing the kind of socks you will usually wear with the shoes. ? Avoid plastic shoes. They may rub your feet and cause blisters. Good shoes should be made of materials that are flexible and breathable, such as leather or cloth. ? Break in new shoes slowly by wearing them for no more than an hour a day for several days. Take extra time to check your feet for red areas, blisters, or other problems after you wear new shoes. · Do not go barefoot. Do not wear sandals, and do not wear shoes with very thin soles. Thin soles are easy to puncture. They also do not protect your feet from hot pavement or cold weather. · Have your doctor check your feet during each visit. If you have a foot problem, see your doctor. Do not try to treat an early foot problem at home. Home remedies or treatments that you can buy without a prescription (such as corn removers) can be harmful. · Always get early treatment for foot problems. A minor irritation can lead to a major problem if not properly cared for early. When should you call for help? Call your doctor now or seek immediate medical care if: 
  · You have a foot sore, an ulcer or break in the skin that is not healing after 4 days, bleeding corns or calluses, or an ingrown toenail.  
  · You have blue or black areas, which can mean bruising or blood flow problems.  
  · You have peeling skin or tiny blisters between your toes or cracking or oozing of the skin.  
  · You have a fever for more than 24 hours and a foot sore.  
  · You have new numbness or tingling in your feet that does not go away after you move your feet or change positions.  
  · You have unexplained or unusual swelling of the foot or ankle.  
 Watch closely for changes in your health, and be sure to contact your doctor if: 
  · You cannot do proper foot care. Where can you learn more? Go to http://dominik-mahin.info/. Enter A739 in the search box to learn more about \"Diabetes Foot Health: Care Instructions. \" Current as of: April 16, 2019 Content Version: 12.2 © 7935-5559 [employer]. Care instructions adapted under license by [employer] (which disclaims liability or warranty for this information). If you have questions about a medical condition or this instruction, always ask your healthcare professional. [address] any warranty or liability for your use of this information.

## 2019-12-19 ENCOUNTER — OFFICE VISIT (OUTPATIENT)
Dept: FAMILY MEDICINE CLINIC | Age: 49
End: 2019-12-19

## 2019-12-19 VITALS
OXYGEN SATURATION: 97 % | RESPIRATION RATE: 16 BRPM | BODY MASS INDEX: 34.69 KG/M2 | WEIGHT: 221 LBS | HEART RATE: 65 BPM | HEIGHT: 67 IN | TEMPERATURE: 98.2 F | DIASTOLIC BLOOD PRESSURE: 85 MMHG | SYSTOLIC BLOOD PRESSURE: 128 MMHG

## 2019-12-19 DIAGNOSIS — E11.9 TYPE 2 DIABETES MELLITUS WITHOUT COMPLICATION, WITH LONG-TERM CURRENT USE OF INSULIN (HCC): Primary | ICD-10-CM

## 2019-12-19 DIAGNOSIS — Z79.4 TYPE 2 DIABETES MELLITUS WITHOUT COMPLICATION, WITH LONG-TERM CURRENT USE OF INSULIN (HCC): Primary | ICD-10-CM

## 2019-12-19 DIAGNOSIS — J06.9 VIRAL UPPER RESPIRATORY TRACT INFECTION: ICD-10-CM

## 2019-12-19 LAB — GLUCOSE POC: 89 MG/DL

## 2019-12-19 RX ORDER — AZITHROMYCIN 250 MG/1
TABLET, FILM COATED ORAL
Qty: 6 TAB | Refills: 0 | Status: SHIPPED | OUTPATIENT
Start: 2019-12-19 | End: 2019-12-24

## 2019-12-19 NOTE — PROGRESS NOTES
HPI  Nani Benoit is a 47 y.o. female being seen today for   Chief Complaint   Patient presents with    Nasal Congestion     Yellow phlegm    Fatigue     Patient stated she has been having shortness of breath. O2 started at 92 and went up to 97   .follow up for this pt with diabetes, hypertension, pinched nerve in neck. She was seen on a few weeks ago with fatigue  and had labs done. Her a1c is down to 8.5 from 11.1   she states that her neck pain is basically gone. She has been fatigued for a jeremiah or so and was seen for this and had labs done. Labs showed iron levels were in the lower half of normal.  Cbc ok. She has chest congestion for a few weeks off and on. Not improving. No fever    Past Medical History:   Diagnosis Date    Asthma     Bronchitis     Hiatal hernia 2013    Hypertension     Knee deformity, acquired 1996    left knee    Pancreatitis 2005         ROS  Patient states that she is feeling well. Denies complaints of chest pain, shortness of breath, swelling of legs, dizziness or weakness. she denies nausea, vomiting or diarrhea. Current Outpatient Medications   Medication Sig    azithromycin (ZITHROMAX) 250 mg tablet Take 2 tablets today, then take 1 tablet daily    insulin glargine (LANTUS,BASAGLAR) 100 unit/mL (3 mL) inpn Increase to 12 units daily. Indications: type 2 diabetes mellitus    naproxen (NAPROSYN) 500 mg tablet Take 1 Tab by mouth two (2) times daily (with meals).  cyclobenzaprine (FLEXERIL) 10 mg tablet Take 1 Tab by mouth two (2) times daily as needed for Muscle Spasm(s).  predniSONE (DELTASONE) 10 mg tablet 4 po daily x one day, 3 po daily for one day, 2 po daily x one day, one po daily x one day    diclofenac (VOLTAREN XR) 100 mg ER tablet Take 100 mg by mouth two (2) times a day.  ketorolac (TORADOL) 10 mg tablet Take 10 mg by mouth every eight (8) hours as needed for Pain.     lidocaine (LIDODERM) 5 % Apply 1 patch as directed for 12 hours every 24 hours (12 hours on, 12 hours off)    methocarbamol (ROBAXIN) 750 mg tablet TAKE 1 TABLET BY MOUTH EVERY 4 HOURS AS NEEDED FOR 10 DAYS    insulin lispro (HUMALOG KWIKPEN INSULIN) 100 unit/mL kwikpen Sliding scale tid prn    ondansetron hcl (ZOFRAN) 4 mg tablet Take 1 Tab by mouth every eight (8) hours as needed for Nausea.  metFORMIN ER (GLUCOPHAGE XR) 500 mg tablet Take 1 Tab by mouth daily (with dinner).  cyclobenzaprine (FLEXERIL) 10 mg tablet Take 1 Tab by mouth nightly.  albuterol (VENTOLIN HFA) 90 mcg/actuation inhaler Ventolin HFA 90 mcg/actuation aerosol inhaler    losartan (COZAAR) 100 mg tablet Take 100 mg by mouth daily.  amLODIPine (NORVASC) 10 mg tablet Take 10 mg by mouth daily. No current facility-administered medications for this visit. PE  Visit Vitals  /85 (BP 1 Location: Left arm, BP Patient Position: Sitting)   Pulse 65   Temp 98.2 °F (36.8 °C) (Temporal)   Resp 16   Ht 5' 7\" (1.702 m)   Wt 221 lb (100.2 kg)   LMP 01/24/2014 (LMP Unknown)   SpO2 97%   BMI 34.61 kg/m²        Alert and oriented with normal mood and affect. she is well developed and well nourished . Lungs are clear without wheezing. Heart rate is regular without murmurs or gallops. There is no lower extremity edema. OP/PP clear. No cerv LAD. Results for orders placed or performed in visit on 12/19/19   AMB POC GLUCOSE BLOOD, BY GLUCOSE MONITORING DEVICE   Result Value Ref Range    Glucose POC 89 mg/dL         Assessment and Plan:        ICD-10-CM ICD-9-CM    1. Type 2 diabetes mellitus without complication, with long-term current use of insulin ([facility]) E11.9 250.00 AMB POC GLUCOSE BLOOD, BY GLUCOSE MONITORING DEVICE    Z79.4 V58.67    2. Viral upper respiratory tract infection J06.9 465.9      No better in a few days she can fill and take zpack  Follow up 3 mos or sooner prn  Declined flu shot but she may return when feeling better.      DM follow up in [address] [place] [address] MD

## 2019-12-19 NOTE — PROGRESS NOTES
Chief Complaint   Patient presents with    Nasal Congestion     Yellow phlegm    Fatigue     Patient stated she has been having shortness of breath. O2 started at 92 and went up to 97   1. Have you been to the ER, urgent care clinic since your last visit? Hospitalized since your last visit? No    2. Have you seen or consulted any other health care providers outside of the [address] [place] [address] since your last visit? Include any pap smears or colon screening.  No

## 2020-01-02 ENCOUNTER — OFFICE VISIT (OUTPATIENT)
Dept: FAMILY MEDICINE CLINIC | Age: 50
End: 2020-01-02

## 2020-01-02 VITALS
OXYGEN SATURATION: 96 % | WEIGHT: 209 LBS | BODY MASS INDEX: 32.8 KG/M2 | TEMPERATURE: 98.1 F | HEIGHT: 67 IN | DIASTOLIC BLOOD PRESSURE: 95 MMHG | HEART RATE: 66 BPM | SYSTOLIC BLOOD PRESSURE: 144 MMHG | RESPIRATION RATE: 18 BRPM

## 2020-01-02 DIAGNOSIS — E11.9 TYPE 2 DIABETES MELLITUS WITHOUT COMPLICATION, WITH LONG-TERM CURRENT USE OF INSULIN (HCC): ICD-10-CM

## 2020-01-02 DIAGNOSIS — Z79.4 TYPE 2 DIABETES MELLITUS WITHOUT COMPLICATION, WITH LONG-TERM CURRENT USE OF INSULIN (HCC): ICD-10-CM

## 2020-01-02 DIAGNOSIS — J34.89 SINUS PRESSURE: ICD-10-CM

## 2020-01-02 DIAGNOSIS — I10 ESSENTIAL HYPERTENSION: ICD-10-CM

## 2020-01-02 DIAGNOSIS — Z00.00 ROUTINE CHECK-UP: Primary | ICD-10-CM

## 2020-01-02 LAB — GLUCOSE POC: 129 MG/DL

## 2020-01-02 RX ORDER — AMLODIPINE BESYLATE 10 MG/1
10 TABLET ORAL DAILY
Qty: 90 TAB | Refills: 1 | Status: SHIPPED | OUTPATIENT
Start: 2020-01-02 | End: 2020-04-02

## 2020-01-02 RX ORDER — LOSARTAN POTASSIUM 100 MG/1
100 TABLET ORAL DAILY
Qty: 90 TAB | Refills: 1 | Status: SHIPPED | OUTPATIENT
Start: 2020-01-02 | End: 2021-10-15 | Stop reason: SDUPTHER

## 2020-01-02 RX ORDER — METFORMIN HYDROCHLORIDE 500 MG/1
1000 TABLET, EXTENDED RELEASE ORAL
Qty: 180 TAB | Refills: 1 | Status: SHIPPED | OUTPATIENT
Start: 2020-01-02 | End: 2021-10-15 | Stop reason: SDUPTHER

## 2020-01-02 RX ORDER — INSULIN GLARGINE 100 [IU]/ML
INJECTION, SOLUTION SUBCUTANEOUS
Qty: 5 PEN | Refills: 0 | Status: SHIPPED | COMMUNITY
Start: 2020-01-02 | End: 2021-12-02

## 2020-01-02 NOTE — PATIENT INSTRUCTIONS
Vaccine Information Statement    Influenza (Flu) Vaccine (Inactivated or Recombinant): What You Need to Know    Many Vaccine Information Statements are available in Maltese and other languages. See www.immunize.org/vis  Hojas de información sobre vacunas están disponibles en español y en muchos otros idiomas. Visite www.immunize.org/vis    1. Why get vaccinated? Influenza vaccine can prevent influenza (flu). Flu is a contagious disease that spreads around the [facility] every year, usually between October and May. Anyone can get the flu, but it is more dangerous for some people. Infants and young children, people 72years of age and older, pregnant women, and people with certain health conditions or a weakened immune system are at greatest risk of flu complications. Pneumonia, bronchitis, sinus infections and ear infections are examples of flu-related complications. If you have a medical condition, such as heart disease, cancer or diabetes, flu can make it worse. Flu can cause fever and chills, sore throat, muscle aches, fatigue, cough, headache, and runny or stuffy nose. Some people may have vomiting and diarrhea, though this is more common in children than adults. Each year thousands of people in the [facility] die from flu, and many more are hospitalized. Flu vaccine prevents millions of illnesses and flu-related visits to the doctor each year. 2. Influenza vaccines     CDC recommends everyone 10months of age and older get vaccinated every flu season. Children 6 months through 6years of age may need 2 doses during a single flu season. Everyone else needs only 1 dose each flu season. It takes about 2 weeks for protection to develop after vaccination. There are many flu viruses, and they are always changing. Each year a new flu vaccine is made to protect against three or four viruses that are likely to cause disease in the upcoming flu season.  Even when the vaccine doesnt exactly match these viruses, it may still provide some protection. Influenza vaccine does not cause flu. Influenza vaccine may be given at the same time as other vaccines. 3. Talk with your health care provider    Tell your vaccine provider if the person getting the vaccine:   Has had an allergic reaction after a previous dose of influenza vaccine, or has any severe, life-threatening allergies.  Has ever had Guillain-Barré Syndrome (also called GBS). In some cases, your health care provider may decide to postpone influenza vaccination to a future visit. People with minor illnesses, such as a cold, may be vaccinated. People who are moderately or severely ill should usually wait until they recover before getting influenza vaccine. Your health care provider can give you more information. 4. Risks of a reaction     Soreness, redness, and swelling where shot is given, fever, muscle aches, and headache can happen after influenza vaccine.  There may be a very small increased risk of Guillain-Barré Syndrome (GBS) after inactivated influenza vaccine (the flu shot). [facility] children who get the flu shot along with pneumococcal vaccine (PCV13), and/or DTaP vaccine at the same time might be slightly more likely to have a seizure caused by fever. Tell your health care provider if a child who is getting flu vaccine has ever had a seizure. People sometimes faint after medical procedures, including vaccination. Tell your provider if you feel dizzy or have vision changes or ringing in the ears. As with any medicine, there is a very remote chance of a vaccine causing a severe allergic reaction, other serious injury, or death. 5. What if there is a serious problem? An allergic reaction could occur after the vaccinated person leaves the clinic.  If you see signs of a severe allergic reaction (hives, swelling of the face and throat, difficulty breathing, a fast heartbeat, dizziness, or weakness), call 9-1-1 and get the person to the nearest hospital.    For other signs that concern you, call your health care provider. Adverse reactions should be reported to the Vaccine Adverse Event Reporting System (VAERS). Your health care provider will usually file this report, or you can do it yourself. Visit the VAERS website at www.vaers. [facility].gov or call 7-709.631.4091. VAERS is only for reporting reactions, and VAERS staff do not give medical advice. 6. The National Vaccine Injury Compensation Program    The [facility] Vaccine Injury Compensation Program (VICP) is a federal program that was created to compensate people who may have been injured by certain vaccines. Visit the VICP website at www.[facility].gov/vaccinecompensation or call 3-328.251.6639 to learn about the program and about filing a claim. There is a time limit to file a claim for compensation. 7. How can I learn more?  Ask your health care provider.  Call your local or state health department.  Contact the Centers for Disease Control and Prevention (CDC):  - Call 2-543.473.9530 (9-608-MXB-INFO) or  - Visit CDCs influenza website at www.cdc.gov/flu    Vaccine Information Statement (Interim)  Inactivated Influenza Vaccine   8/15/2019  [address]air [id number]   Department of Health and Human Services  Centers for Disease Control and Prevention    Office Use Only

## 2020-01-02 NOTE — PROGRESS NOTES
Cesar Adam is a 47 y.o. female who presents for routine immunizations. She denies any symptoms , reactions or allergies that would exclude them from being immunized today. Risks and adverse reactions were discussed and the VIS was given to them. All questions were addressed. She was observed for 10 min post injection. There were no reactions observed.     Judeth Hashimoto

## 2020-01-02 NOTE — PROGRESS NOTES
JEAN CARLOS Cadet is a 47 y.o. female being seen today for   Chief Complaint   Patient presents with    Medication Refill   .  she states that she is low on her bp meds. She does have some insluin but running low. Using 12 units daily and wathcing her diet with good results. She has not turned in her medicaid appliication or FIT test    Seen a few weeks ago with uri/sinus sx. Does still have sinus pressure but is better. Did not need abx. Taking zyrtec    Past Medical History:   Diagnosis Date    Asthma     Bronchitis     Hiatal hernia 2013    Hypertension     Knee deformity, acquired 1996    left knee    Pancreatitis 2005         ROS  Patient states that she is feeling well. Denies complaints of chest pain, shortness of breath, swelling of legs, dizziness or weakness. she denies nausea, vomiting or diarrhea. Current Outpatient Medications   Medication Sig    losartan (COZAAR) 100 mg tablet Take 1 Tab by mouth daily.  amLODIPine (NORVASC) 10 mg tablet Take 1 Tab by mouth daily.  metFORMIN ER (GLUCOPHAGE XR) 500 mg tablet Take 2 Tabs by mouth daily (with dinner).  insulin glargine (LANTUS,BASAGLAR) 100 unit/mL (3 mL) inpn 12 units daily    insulin lispro (HUMALOG KWIKPEN INSULIN) 100 unit/mL kwikpen Sliding scale tid prn    naproxen (NAPROSYN) 500 mg tablet Take 1 Tab by mouth two (2) times daily (with meals).  cyclobenzaprine (FLEXERIL) 10 mg tablet Take 1 Tab by mouth two (2) times daily as needed for Muscle Spasm(s).  predniSONE (DELTASONE) 10 mg tablet 4 po daily x one day, 3 po daily for one day, 2 po daily x one day, one po daily x one day    diclofenac (VOLTAREN XR) 100 mg ER tablet Take 100 mg by mouth two (2) times a day.  ketorolac (TORADOL) 10 mg tablet Take 10 mg by mouth every eight (8) hours as needed for Pain.     lidocaine (LIDODERM) 5 % Apply 1 patch as directed for 12 hours every 24 hours (12 hours on, 12 hours off)    methocarbamol (ROBAXIN) 750 mg tablet TAKE 1 TABLET BY MOUTH EVERY 4 HOURS AS NEEDED FOR 10 DAYS    ondansetron hcl (ZOFRAN) 4 mg tablet Take 1 Tab by mouth every eight (8) hours as needed for Nausea.  cyclobenzaprine (FLEXERIL) 10 mg tablet Take 1 Tab by mouth nightly.  albuterol (VENTOLIN HFA) 90 mcg/actuation inhaler Ventolin HFA 90 mcg/actuation aerosol inhaler     No current facility-administered medications for this visit. PE  Visit Vitals  BP (!) 144/95 (BP 1 Location: Left arm, BP Patient Position: Sitting)   Pulse 66   Temp 98.1 °F (36.7 °C) (Temporal)   Resp 18   Ht 5' 7\" (1.702 m)   Wt 209 lb (94.8 kg)   LMP 01/24/2014 (LMP Unknown)   SpO2 96%   BMI 32.73 kg/m²        Alert and oriented with normal mood and affect. she is well developed and well nourished . Results for orders placed or performed in visit on 01/02/20   AMB POC GLUCOSE BLOOD, BY GLUCOSE MONITORING DEVICE   Result Value Ref Range    Glucose  mg/dL         Assessment and Plan:        ICD-10-CM ICD-9-CM    1. Routine check-up Z00.00 V70.0 AMB POC GLUCOSE BLOOD, BY GLUCOSE MONITORING DEVICE   2. Type 2 diabetes mellitus without complication, with long-term current use of insulin ([facility])    Blood sugars are much improved. She is on a low dose of lantus. Given sample lantus today but reviewed that she can likely add in some additional oral meds and possibly wean off insulin eventually. For now will increase metformin to 1000mg daily. Once her medicaid situation is clarified we can sign her up for pap rx assistance as well. She would be a good candidate for SGL2 inhibitor such as invokanna. E11.9 250.00     Z79.4 V58.67      Sinus congestion-add flonase.    Follow up for WWE, pap, breast exam, microalbumin  She is pending eye exam at [place] [address] in medicaid application and fit test      Sylwia Sanz MD

## 2020-01-10 ENCOUNTER — PATIENT OUTREACH (OUTPATIENT)
Dept: FAMILY MEDICINE CLINIC | Age: 50
End: 2020-01-10

## 2020-01-10 NOTE — LETTER
1/21/2020 1:46 PM 
 
Ms. Andrew Cadet [id number] [facility], [facility] [address] Dear Andrew Cadet I am the Nurse Navigator working with the [address] [place] [address]. I have been unable to reach you by phone. I would like to follow up with you about your Diabetes when you have some time. You can reach me directly at [phone]. Thank you for allowing us to participate in your care!  
 
 
 
Sincerely, 
 
 
Mague Garcia RN

## 2020-01-21 NOTE — PROGRESS NOTES
Follow up Chronic Condition    Focus: Diabetes    Unable to reach patient by phone. Letter mailed to patient. Patient has my number if questions arise.

## 2020-02-11 ENCOUNTER — DOCUMENTATION ONLY (OUTPATIENT)
Dept: FAMILY MEDICINE CLINIC | Age: 50
End: 2020-02-11

## 2020-02-11 NOTE — LETTER
2/11/2020 2:19 PM 
 
Ms. Jose Garcia [id number] [place], [address] Dear Jose Garcia, 
 
I am the Nurse Navigator working with the [address] [place] [address]. It has been a pleasure working with you on your Diabetes. However, since I have not been able to reach you for a month, I must terminate your enrollment in case management. I encourage you to continue to follow up with the provider every 3 months to monitor your Diabetes. If you have any questions, you may contact me directly at [phone]. Thank you for allowing me to participate in your care!  
 
 
 
 
Sincerely, 
 
 
Jose Michaud RN

## 2020-02-11 NOTE — PROGRESS NOTES
No response to follow up letter mailed to patient. Episode closed. Letter mailed to patient to notify of end of CM. Care management goals have been completed at this time. No further nurse navigator follow up scheduled. Goals Addressed                 This Visit's Progress     COMPLETED: Patient will add protein to her breakfast and eat protein anytime she has carbs.  COMPLETED: Patient will get more steps in throughout her day by parking further out and moving more while at home. (pt-stated)        12/10- Patient trying to be more active but having difficulty due to fatigue.  COMPLETED: Patient will keep a food diary to help identify foods affecting her BG.        12/10- Patient has noted that eating sweets shoots her BG up too high so is avoidant. Pt has nurse navigator's contact information for any further questions, concerns, or needs. Patients upcoming visits:  No future appointments.

## 2020-06-19 ENCOUNTER — HOSPITAL ENCOUNTER (OUTPATIENT)
Dept: ULTRASOUND IMAGING | Age: 50
Discharge: HOME | End: 2020-06-19
Attending: PLASTIC SURGERY
Payer: COMMERCIAL

## 2020-06-19 DIAGNOSIS — K43.2 HERNIA, INCISIONAL: ICD-10-CM

## 2020-06-19 PROCEDURE — 76705 ECHO EXAM OF ABDOMEN: CPT

## 2020-12-26 RX ORDER — CYCLOBENZAPRINE HCL 10 MG
TABLET ORAL
Qty: 30 TAB | Refills: 0 | OUTPATIENT
Start: 2020-12-26

## 2021-07-12 NOTE — TELEPHONE ENCOUNTER
----- Message from Mason Garcia MD sent at 10/28/2019  9:39 AM EDT -----  Regarding: RE: Treatment  She should keep on taking her robaxin and diclofenac till she sees Dr Keila John 11/8    thanks  ----- Message -----  From: Karis Swann  Sent: 10/24/2019   3:18 PM EDT  To: Mason Garcia MD  Subject: Treatment                                        Patient called stating she completed her prednisone and it help. She will like to know what are the next steps.
Spoke with patient. Informed her of the following. Patient expressed understanding.
167.64

## 2021-07-30 RX ORDER — AMLODIPINE BESYLATE 10 MG/1
TABLET ORAL
Qty: 30 TABLET | Refills: 0 | Status: SHIPPED | OUTPATIENT
Start: 2021-07-30 | End: 2021-10-15 | Stop reason: SDUPTHER

## 2021-08-10 ENCOUNTER — TELEPHONE (OUTPATIENT)
Dept: FAMILY MEDICINE CLINIC | Facility: CLINIC | Age: 51
End: 2021-08-10

## 2021-08-10 NOTE — TELEPHONE ENCOUNTER
----- Message from Hari Khan MD sent at 7/30/2021  1:12 PM EDT -----  Regarding: follow up  Hi jhoana    We have not seen this pt in a year and a half. Can you call and schedule her for a visit?    I will send her 30 days of meds    Obviouilsly if she has a new pcp or insurance she should follow up with sonia dolan

## 2021-08-10 NOTE — TELEPHONE ENCOUNTER
Spoke with patient. Patient has had insurance since the beginning of the year. Patient stated her insurance will end soon but she does not know the date. She has not seen anyone for her diabetes. Informed patient she should follow up with a PCP soon. Patient expressed understanding.

## 2021-10-15 ENCOUNTER — OFFICE VISIT (OUTPATIENT)
Dept: FAMILY MEDICINE CLINIC | Facility: CLINIC | Age: 51
End: 2021-10-15

## 2021-10-15 ENCOUNTER — HOSPITAL ENCOUNTER (OUTPATIENT)
Dept: LAB | Age: 51
Discharge: HOME | End: 2021-10-15

## 2021-10-15 VITALS
OXYGEN SATURATION: 99 % | DIASTOLIC BLOOD PRESSURE: 110 MMHG | HEIGHT: 67 IN | BODY MASS INDEX: 35.63 KG/M2 | WEIGHT: 227 LBS | RESPIRATION RATE: 16 BRPM | SYSTOLIC BLOOD PRESSURE: 186 MMHG | HEART RATE: 57 BPM | TEMPERATURE: 98 F

## 2021-10-15 DIAGNOSIS — I10 ESSENTIAL HYPERTENSION: ICD-10-CM

## 2021-10-15 DIAGNOSIS — E11.9 TYPE 2 DIABETES MELLITUS WITHOUT COMPLICATION, WITH LONG-TERM CURRENT USE OF INSULIN (HCC): Primary | ICD-10-CM

## 2021-10-15 DIAGNOSIS — E11.9 TYPE 2 DIABETES MELLITUS WITHOUT COMPLICATION, WITH LONG-TERM CURRENT USE OF INSULIN (HCC): ICD-10-CM

## 2021-10-15 DIAGNOSIS — Z79.4 TYPE 2 DIABETES MELLITUS WITHOUT COMPLICATION, WITH LONG-TERM CURRENT USE OF INSULIN (HCC): Primary | ICD-10-CM

## 2021-10-15 DIAGNOSIS — Z00.00 HEALTHCARE MAINTENANCE: ICD-10-CM

## 2021-10-15 DIAGNOSIS — Z79.4 TYPE 2 DIABETES MELLITUS WITHOUT COMPLICATION, WITH LONG-TERM CURRENT USE OF INSULIN (HCC): ICD-10-CM

## 2021-10-15 LAB
ALBUMIN SERPL-MCNC: 4 G/DL (ref 3.4–5)
ALBUMIN/GLOB SERPL: 1.2 {RATIO} (ref 0.8–1.7)
ALP SERPL-CCNC: 128 U/L (ref 45–117)
ALT SERPL-CCNC: 62 U/L (ref 13–56)
ANION GAP SERPL CALC-SCNC: 4 MMOL/L (ref 3–18)
AST SERPL-CCNC: 25 U/L (ref 10–38)
BASOPHILS # BLD: 0.1 K/UL (ref 0–0.1)
BASOPHILS NFR BLD: 1 % (ref 0–2)
BILIRUB SERPL-MCNC: 0.3 MG/DL (ref 0.2–1)
BUN SERPL-MCNC: 19 MG/DL (ref 7–18)
BUN/CREAT SERPL: 23 (ref 12–20)
CALCIUM SERPL-MCNC: 10.3 MG/DL (ref 8.5–10.1)
CHLORIDE SERPL-SCNC: 110 MMOL/L (ref 100–111)
CHOLEST SERPL-MCNC: 172 MG/DL
CO2 SERPL-SCNC: 27 MMOL/L (ref 21–32)
CREAT SERPL-MCNC: 0.82 MG/DL (ref 0.6–1.3)
CREAT UR-MCNC: 22 MG/DL (ref 30–125)
DIFFERENTIAL METHOD BLD: ABNORMAL
EOSINOPHIL # BLD: 0.2 K/UL (ref 0–0.4)
EOSINOPHIL NFR BLD: 3 % (ref 0–5)
ERYTHROCYTE [DISTWIDTH] IN BLOOD BY AUTOMATED COUNT: 13.7 % (ref 11.6–14.5)
EST. AVERAGE GLUCOSE BLD GHB EST-MCNC: 157 MG/DL
GLOBULIN SER CALC-MCNC: 3.3 G/DL (ref 2–4)
GLUCOSE SERPL-MCNC: 124 MG/DL (ref 74–99)
HBA1C MFR BLD: 7.1 % (ref 4.2–5.6)
HCT VFR BLD AUTO: 40.9 % (ref 35–45)
HDLC SERPL-MCNC: 74 MG/DL (ref 40–60)
HDLC SERPL: 2.3 {RATIO} (ref 0–5)
HGB BLD-MCNC: 13.4 G/DL (ref 12–16)
LDLC SERPL CALC-MCNC: 82.8 MG/DL (ref 0–100)
LIPID PROFILE,FLP: ABNORMAL
LYMPHOCYTES # BLD: 2.4 K/UL (ref 0.9–3.6)
LYMPHOCYTES NFR BLD: 51 % (ref 21–52)
MCH RBC QN AUTO: 27.7 PG (ref 24–34)
MCHC RBC AUTO-ENTMCNC: 32.8 G/DL (ref 31–37)
MCV RBC AUTO: 84.5 FL (ref 78–100)
MICROALBUMIN UR-MCNC: 1.64 MG/DL (ref 0–3)
MICROALBUMIN/CREAT UR-RTO: 75 MG/G (ref 0–30)
MONOCYTES # BLD: 0.3 K/UL (ref 0.05–1.2)
MONOCYTES NFR BLD: 6 % (ref 3–10)
NEUTS SEG # BLD: 1.8 K/UL (ref 1.8–8)
NEUTS SEG NFR BLD: 38 % (ref 40–73)
PLATELET # BLD AUTO: 260 K/UL (ref 135–420)
PMV BLD AUTO: 10.2 FL (ref 9.2–11.8)
POTASSIUM SERPL-SCNC: 4.3 MMOL/L (ref 3.5–5.5)
PROT SERPL-MCNC: 7.3 G/DL (ref 6.4–8.2)
RBC # BLD AUTO: 4.84 M/UL (ref 4.2–5.3)
SODIUM SERPL-SCNC: 141 MMOL/L (ref 136–145)
TRIGL SERPL-MCNC: 76 MG/DL (ref ?–150)
VLDLC SERPL CALC-MCNC: 15.2 MG/DL
WBC # BLD AUTO: 4.7 K/UL (ref 4.6–13.2)

## 2021-10-15 PROCEDURE — 99214 OFFICE O/P EST MOD 30 MIN: CPT | Performed by: FAMILY MEDICINE

## 2021-10-15 PROCEDURE — 80061 LIPID PANEL: CPT

## 2021-10-15 PROCEDURE — 83036 HEMOGLOBIN GLYCOSYLATED A1C: CPT

## 2021-10-15 PROCEDURE — 36415 COLL VENOUS BLD VENIPUNCTURE: CPT

## 2021-10-15 PROCEDURE — 86803 HEPATITIS C AB TEST: CPT

## 2021-10-15 PROCEDURE — 3051F HG A1C>EQUAL 7.0%<8.0%: CPT | Performed by: FAMILY MEDICINE

## 2021-10-15 PROCEDURE — 80053 COMPREHEN METABOLIC PANEL: CPT

## 2021-10-15 PROCEDURE — 82043 UR ALBUMIN QUANTITATIVE: CPT

## 2021-10-15 PROCEDURE — 85025 COMPLETE CBC W/AUTO DIFF WBC: CPT

## 2021-10-15 RX ORDER — FLUTICASONE PROPIONATE 50 MCG
1 SPRAY, SUSPENSION (ML) NASAL DAILY
Qty: 10 G | Refills: 0 | Status: SHIPPED | COMMUNITY
Start: 2021-10-15

## 2021-10-15 RX ORDER — FLASH GLUCOSE SCANNING READER
EACH MISCELLANEOUS
Qty: 1 EACH | Refills: 0 | Status: SHIPPED | OUTPATIENT
Start: 2021-10-15

## 2021-10-15 RX ORDER — FLASH GLUCOSE SENSOR
KIT MISCELLANEOUS
Qty: 1 KIT | Refills: 5 | Status: SHIPPED | OUTPATIENT
Start: 2021-10-15

## 2021-10-15 RX ORDER — AMLODIPINE BESYLATE 10 MG/1
10 TABLET ORAL DAILY
Qty: 90 TABLET | Refills: 1 | Status: SHIPPED | OUTPATIENT
Start: 2021-10-15 | End: 2022-03-25

## 2021-10-15 RX ORDER — INSULIN GLARGINE 100 [IU]/ML
INJECTION, SOLUTION SUBCUTANEOUS
Qty: 1 EACH | Refills: 0 | Status: SHIPPED | COMMUNITY
Start: 2021-10-15 | End: 2021-12-02

## 2021-10-15 RX ORDER — INSULIN LISPRO 100 [IU]/ML
INJECTION, SOLUTION INTRAVENOUS; SUBCUTANEOUS
Qty: 1 EACH | Refills: 0 | Status: SHIPPED | COMMUNITY
Start: 2021-10-15 | End: 2021-12-02

## 2021-10-15 RX ORDER — LOSARTAN POTASSIUM 100 MG/1
100 TABLET ORAL DAILY
Qty: 90 TABLET | Refills: 1 | Status: SHIPPED | OUTPATIENT
Start: 2021-10-15

## 2021-10-15 RX ORDER — METFORMIN HYDROCHLORIDE 500 MG/1
1000 TABLET, EXTENDED RELEASE ORAL
Qty: 180 TABLET | Refills: 1 | Status: SHIPPED | OUTPATIENT
Start: 2021-10-15 | End: 2022-05-03

## 2021-10-15 NOTE — PROGRESS NOTES
Patient presents for lab draw ordered by:    Ordering Provider: Remberto Do  Ordering Department/Practice:   Phone: [phone]  Date Ordered: 10/15/21  The following labs were drawn and sent to [facility] by Annalise Estrada LPN:    CBC, Lipid Profile, CMP, HgA1C and Hep C   Urine microalbumin    The following tubes were sent:    Gold  ( 2) and Lavender  ( 1)    Draw site left hand. Patient tolerated draw with no distress. Diabetic foot exam performed by Annalise Estrada LPN      Measurement  Response Nurse Comment Physician Comment   Monofilament  R - 6/6  L - 6/6     Pulse DP R - present  L - present     Pulse TP R - present  L - present     Structural deformity R - None  L - None     Skin Integrity / Deformity R - None  L - None        Reviewed by: Sample of Fluticasone Lantus kwik pen and Humalog kwik pen given to patient        Discharge instructions reviewed with patient    Medication list and understanding of medications reviewed with patient. OTC and herbal medications reviewed and added to med list if applicable  Barriers to adherence assessed. Guidance given regarding new medications this visit, including reason for taking this medicine, and common side effects. AVS given to patient. Explained to patient. Patient expressed understanding.

## 2021-10-15 NOTE — PATIENT INSTRUCTIONS
Sliding Scale - Moderate Sensitivity  BG Value        Insulin Dose  0-60         ** Hypoglycemia Protocol **        No Insulin  121-169    Insulin 2 Units  170-219    Insulin 4 Units  220-269    Insulin 6 Units  270-300    Insulin 8 Units  Greater than 300 - 10 Units/Call Provider

## 2021-10-15 NOTE — PROGRESS NOTES
JEAN CARLOS  Jesus Brunson is a 45 y.o. female being seen today for   Chief Complaint   Patient presents with    Diabetes     \"pt been without medication for about a year\"   . she states that she has been out of insulin for about a year. For awhile she was down under 200 on her weight and off all carbs but now she is back to her old diet and she can tell her sugars are high. She thinks she needs to go back on insulin. She is also out of her bp med and having some headaches. She has checked a few sugars and they were in 200s but she does not check much. She does not like pricking her finger and she would like to get a CGM        Past Medical History:   Diagnosis Date    Asthma     Bronchitis     Diabetes ([address]     Hiatal hernia 2013    Hypertension     Knee deformity, acquired 1996    left knee    Pancreatitis 2005         ROS  Patient states that she is feeling well. Denies complaints of chest pain, shortness of breath, swelling of legs, dizziness or weakness. she denies nausea, vomiting or diarrhea. Current Outpatient Medications   Medication Sig    amLODIPine (NORVASC) 10 mg tablet Take 1 Tablet by mouth daily.  losartan (COZAAR) 100 mg tablet Take 1 Tablet by mouth daily.  metFORMIN ER (GLUCOPHAGE XR) 500 mg tablet Take 2 Tablets by mouth daily (with dinner).  flash glucose sensor (FreeStyle Tawanna 2 Sensor) kit Use as directed    flash glucose scanning reader (FreeStyle Tawanna 2 [place]) misc Use as directed    insulin glargine (LANTUS,BASAGLAR) 100 unit/mL (3 mL) inpn 12 units daily    naproxen (NAPROSYN) 500 mg tablet Take 1 Tab by mouth two (2) times daily (with meals).  insulin lispro (HUMALOG KWIKPEN INSULIN) 100 unit/mL kwikpen Sliding scale tid prn     No current facility-administered medications for this visit.        PE  Visit Vitals  BP (!) 186/110 (BP 1 Location: Right upper arm, BP Patient Position: Sitting, BP Cuff Size: Large adult)   Pulse (!) 57   Temp 98 °F (36.7 °C) (Temporal)   Resp 16   Ht 5' 7\" (1.702 m)   Wt 227 lb (103 kg)   LMP 01/24/2014 (LMP Unknown)   SpO2 99%   BMI 35.55 kg/m²        Alert and oriented with normal mood and affect. she is well developed and well nourished . Lungs are clear without wheezing. Heart rate is regular without murmurs or gallops. There is no lower extremity edema. Results for orders placed or performed in visit on 01/02/20   AMB POC GLUCOSE BLOOD, BY GLUCOSE MONITORING DEVICE   Result Value Ref Range    Glucose  mg/dL         Assessment and Plan:        ICD-10-CM ICD-9-CM    1. Type 2 diabetes mellitus without complication, with long-term current use of insulin ([facility])  E11.9 250.00 LIPID PANEL    Z79.4 V58.67 HEMOGLOBIN A1C WITH EAG   Will need to get new a1c but likely needs to restart insulin at her prior doses. Advised pt she cant start insulin until she has a way to check sugars. Will send in rx for freestyle katie and reader. Also given sample of lantus pen. When her cgm is up and running she can start with 5 units but may need around 10. METABOLIC PANEL, COMPREHENSIVE      CBC WITH AUTOMATED DIFF      MICROALBUMIN, UR, RAND W/ MICROALB/CREAT RATIO   2. Essential hypertension  I10 401.9 LIPID PANEL   Very high due to being off bp meds   HEMOGLOBIN A1C WITH EAG      METABOLIC PANEL, COMPREHENSIVE      CBC WITH AUTOMATED DIFF      MICROALBUMIN, UR, RAND W/ MICROALB/CREAT RATIO   3. Healthcare maintenance  Z00.00 V70.0 LIPID PANEL      HEMOGLOBIN A1C WITH EAG   Not up to date   METABOLIC PANEL, COMPREHENSIVE      CBC WITH AUTOMATED DIFF      MICROALBUMIN, UR, RAND W/ MICROALB/CREAT RATIO      HEPATITIS C AB     Restart bp meds. Start CGM. Pt is motivated to get back on track. Sample basaglar pen. Start at 5-10 units daily when she has the ability to check her sugars.    Behind on health maintenance  Return for WWE and bp check    Selvin Huynh MD

## 2021-10-18 LAB
HCV AB SER IA-ACNC: 0.05 INDEX
HCV AB SERPL QL IA: NEGATIVE
HCV COMMENT,HCGAC: NORMAL

## 2021-11-16 ENCOUNTER — PATIENT OUTREACH (OUTPATIENT)
Dept: FAMILY MEDICINE CLINIC | Facility: CLINIC | Age: 51
End: 2021-11-16

## 2021-11-18 NOTE — PROGRESS NOTES
Patient was received by High Risk Report. PMH:   Past Medical History:   Diagnosis Date    Asthma     Bronchitis     Diabetes ([address]     Hiatal hernia 2013    Hypertension     Knee deformity, acquired 1996    left knee    Pancreatitis 2005       Two patient identifiers verified. Discussed the role of Nurse Navigator and case management program.  Patient agrees to case management services for Hypertension at this time. Patient recently in for OV with PCP. Pt had concerns BG was high but she had not been checking. A1C actually 7.1. Patient reports she has lost weight, now 214lb. She has cut a lot of carbs, drinking Premire protein shakes again. She did not start Lantus or Humalog because A1C is good. She did not get the CGM yet and is still not checking BG. Patient had run out of her BP meds prior to recent appt. BP was 186/110 at her OV. She does not have a monitor at home. She did check BP at a pharmacy once since restarting medication and it was 185/105. Advised patient that the [facility] has a BP monitor she can borrow. Patient will come to clinic tomorrow to . Also advised pt is due for WWE and BP follow up with the provider; appt scheduled. Goals      Patient will obtain BP monitor and start checking BP daily. PCP/Specialist follow up:   Future Appointments   Date Time Provider Pranav Bradley   12/2/2021 11:45 AM MD VIDYA Puente        Patient verbalized understanding of all information discussed. Pt has my contact information for any further questions, concerns, or needs. Patient expressed no questions, concerns or needs for this NN at this time. Follow up with patient next week to check BP.

## 2021-11-24 ENCOUNTER — PATIENT OUTREACH (OUTPATIENT)
Dept: FAMILY MEDICINE CLINIC | Facility: CLINIC | Age: 51
End: 2021-11-24

## 2021-11-24 NOTE — PROGRESS NOTES
Follow up Chronic Condition    Focus: DM/HTN    Patient states she was unable to come to clinic last week to  BP monitor. She is currently out of town. Reminded patient of upcoming appt and advised to ensure she gets the BP monitor at her visit. Patient expressed understanding of all that was discussed. Patient has my number if questions arise. Will follow up with patient in 2 weeks after she has obtained BP monitor.     PCP/Specialist follow up:   Future Appointments   Date Time Provider Pranav Bradley   12/2/2021 11:45 AM MD VIDYA Estevez AMB

## 2021-12-02 ENCOUNTER — OFFICE VISIT (OUTPATIENT)
Dept: FAMILY MEDICINE CLINIC | Facility: CLINIC | Age: 51
End: 2021-12-02

## 2021-12-02 ENCOUNTER — HOSPITAL ENCOUNTER (OUTPATIENT)
Dept: LAB | Age: 51
Discharge: HOME | End: 2021-12-02

## 2021-12-02 VITALS
DIASTOLIC BLOOD PRESSURE: 91 MMHG | HEIGHT: 67 IN | WEIGHT: 214 LBS | SYSTOLIC BLOOD PRESSURE: 147 MMHG | RESPIRATION RATE: 16 BRPM | HEART RATE: 62 BPM | OXYGEN SATURATION: 99 % | BODY MASS INDEX: 33.59 KG/M2 | TEMPERATURE: 96.6 F

## 2021-12-02 DIAGNOSIS — E11.9 TYPE 2 DIABETES MELLITUS WITHOUT COMPLICATION, WITH LONG-TERM CURRENT USE OF INSULIN (HCC): Primary | ICD-10-CM

## 2021-12-02 DIAGNOSIS — N76.1 SUBACUTE VAGINITIS: ICD-10-CM

## 2021-12-02 DIAGNOSIS — Z12.4 SCREENING FOR CERVICAL CANCER: ICD-10-CM

## 2021-12-02 DIAGNOSIS — Z12.11 SCREENING FOR COLORECTAL CANCER: ICD-10-CM

## 2021-12-02 DIAGNOSIS — Z12.31 ENCOUNTER FOR SCREENING MAMMOGRAM FOR MALIGNANT NEOPLASM OF BREAST: ICD-10-CM

## 2021-12-02 DIAGNOSIS — I10 ESSENTIAL HYPERTENSION: ICD-10-CM

## 2021-12-02 DIAGNOSIS — Z79.4 TYPE 2 DIABETES MELLITUS WITHOUT COMPLICATION, WITH LONG-TERM CURRENT USE OF INSULIN (HCC): Primary | ICD-10-CM

## 2021-12-02 DIAGNOSIS — Z11.3 ROUTINE SCREENING FOR STI (SEXUALLY TRANSMITTED INFECTION): ICD-10-CM

## 2021-12-02 DIAGNOSIS — Z12.12 SCREENING FOR COLORECTAL CANCER: ICD-10-CM

## 2021-12-02 LAB
SERVICE CMNT-IMP: NORMAL
WET PREP GENITAL: NORMAL

## 2021-12-02 PROCEDURE — 87624 HPV HI-RISK TYP POOLED RSLT: CPT

## 2021-12-02 PROCEDURE — 87210 SMEAR WET MOUNT SALINE/INK: CPT

## 2021-12-02 PROCEDURE — 87491 CHLMYD TRACH DNA AMP PROBE: CPT

## 2021-12-02 PROCEDURE — 99214 OFFICE O/P EST MOD 30 MIN: CPT | Performed by: FAMILY MEDICINE

## 2021-12-02 PROCEDURE — 3051F HG A1C>EQUAL 7.0%<8.0%: CPT | Performed by: FAMILY MEDICINE

## 2021-12-02 PROCEDURE — 88142 CYTOPATH C/V THIN LAYER: CPT

## 2021-12-02 NOTE — PROGRESS NOTES
HPI    Chief Complaint   Patient presents with    Well Woman   follow up for this pt with diabets and hypertension seen last month. Her a1c was surprisingly good on no insulin at 7.1. She did restart her bp meds but she did not restart insulin. She is taking her metformin. she states that she is here for WWE. Postmenopausal.  Does have some slight discharge. Doubts she is at risk for STD but would like to check. No blood in stool. She does not check her breasts for lumps on a regular basis. Last mammogram one year ago    She is losing weight with low carb diet and thinks she will continue to lose some more. Past Medical History:   Diagnosis Date    Asthma     Bronchitis     Diabetes ([address]     Hiatal hernia 2013    Hypertension     Knee deformity, acquired 1996    left knee    Pancreatitis 2005         ROS  Patient states that she is feeling well. Denies complaints of chest pain, shortness of breath, swelling of legs, dizziness or weakness. she denies nausea, vomiting or diarrhea. Current Outpatient Medications   Medication Sig    amLODIPine (NORVASC) 10 mg tablet Take 1 Tablet by mouth daily.  losartan (COZAAR) 100 mg tablet Take 1 Tablet by mouth daily.  metFORMIN ER (GLUCOPHAGE XR) 500 mg tablet Take 2 Tablets by mouth daily (with dinner).  flash glucose sensor (FreeStyle Tawanna 2 Sensor) kit Use as directed    flash glucose scanning reader (FreeStyle Tawanna 2 [place]) misc Use as directed    fluticasone propionate (FLONASE) 50 mcg/actuation nasal spray 1 [place] by Both Nostrils route daily.  naproxen (NAPROSYN) 500 mg tablet Take 1 Tab by mouth two (2) times daily (with meals). (Patient not taking: Reported on 11/16/2021)     No current facility-administered medications for this visit.        PE  Visit Vitals  BP (!) 147/91 (BP 1 Location: Left arm, BP Patient Position: Sitting, BP Cuff Size: Large adult long)   Pulse 62   Temp (!) 96.6 °F (35.9 °C) (Temporal)   Resp 16   Ht 5' 7\" (1.702 m)   Wt 214 lb (97.1 kg)   LMP 01/24/2014 (LMP Unknown)   SpO2 99%   BMI 33.52 kg/m²        Alert and oriented with normal mood and affect. she is well developed and well nourished . bilateral breasts with no lump or skin changes. Lungs are clear without wheezing. Heart rate is regular without murmurs or gallops. There is no lower extremity edema. External genitalia wnl. Cervix with no discharge or lesion. Assessment and Plan:        ICD-10-CM ICD-9-CM    1. Type 2 diabetes mellitus without complication, with long-term current use of insulin (H[facility])  E11.9 250.00     Z79.4 V58.67    2. Essential hypertension  I10 401.9    3. Screening for colorectal cancer  Z12.11 V76.51 OCCULT BLOOD IMMUNOASSAY,DIAGNOSTIC    Z12.12 V76.41    4. Screening for cervical cancer  Z12.4 V76.2 PAP + HPV DNA (HIGH RISK)   5. Routine screening for STI (sexually transmitted infection)  Z11.3 V74.5    6. Encounter for screening mammogram for malignant neoplasm of breast  Z12.31 V76.12        Pap today  ewl for mammogram  Fit test  Continue weight loss.   recheck wt and bp 3 rakesh Young MD

## 2021-12-02 NOTE — PROGRESS NOTES
Fit given to the patient instruction explained patient express understands instructions    Blood pressure cuff given to patient     Discharge instructions reviewed with patient    Medication list and understanding of medications reviewed with patient. OTC and herbal medications reviewed and added to med list if applicable  Barriers to adherence assessed. Guidance given regarding new medications this visit, including reason for taking this medicine, and common side effects. AVS given to patient. Explained to patient. Patient expressed understanding.

## 2021-12-03 ENCOUNTER — TELEPHONE (OUTPATIENT)
Dept: FAMILY MEDICINE CLINIC | Facility: CLINIC | Age: 51
End: 2021-12-03

## 2021-12-03 LAB
C TRACH RRNA SPEC QL NAA+PROBE: NEGATIVE
N GONORRHOEA RRNA SPEC QL NAA+PROBE: NEGATIVE
SPECIMEN SOURCE: NORMAL

## 2021-12-03 NOTE — TELEPHONE ENCOUNTER
Client eligibility form faxed to [facility] Ms Rakesh Taylor to have mammogram scheduled

## 2021-12-10 ENCOUNTER — PATIENT OUTREACH (OUTPATIENT)
Dept: FAMILY MEDICINE CLINIC | Facility: CLINIC | Age: 51
End: 2021-12-10

## 2021-12-10 NOTE — PROGRESS NOTES
Follow up Chronic Condition    Focus: Hypertension    Goals Addressed                 This Visit's Progress     Patient will obtain BP monitor and start checking BP daily. On track     12/10/21- patient picked up a BP monitor from the [facility] and has been checking her BP some. She is not currently at home so could not give any readings. Read recent result letter to patient. Pt would like an antibiotic for BV. Staff message sent to PCP to request antibiotic for BV. Patient expressed understanding of all that was discussed. Patient has my number if questions arise. Follow up with patient next week to check BP readings.

## 2021-12-11 RX ORDER — METRONIDAZOLE 500 MG/1
500 TABLET ORAL 2 TIMES DAILY
Qty: 10 TABLET | Refills: 0 | Status: SHIPPED | OUTPATIENT
Start: 2021-12-11 | End: 2021-12-16

## 2021-12-17 ENCOUNTER — PATIENT OUTREACH (OUTPATIENT)
Dept: FAMILY MEDICINE CLINIC | Facility: CLINIC | Age: 51
End: 2021-12-17

## 2021-12-17 NOTE — PROGRESS NOTES
Follow up Chronic Condition    Focus: Hypertension    Unable to reach patient by phone. Will attempt again in 2 weeks. Patient has my number if questions arise.

## 2021-12-30 ENCOUNTER — PATIENT OUTREACH (OUTPATIENT)
Dept: FAMILY MEDICINE CLINIC | Facility: CLINIC | Age: 51
End: 2021-12-30

## 2021-12-30 NOTE — LETTER
1/3/2022 2:02 PM    [address]  [address]  [address] [facility] [address]        Dear Varun Arie      I am the Nurse Navigator working with the [address] [place] [address]. I have been unable to reach you by phone. I would like to follow up with you about your hypertension when you have some time. You can reach me directly at [phone]. Thank you for allowing us to participate in your care!         Sincerely,      Carmen Grey RN

## 2022-01-03 NOTE — PROGRESS NOTES
Follow up Chronic Condition    Focus: Hypertension    Unable to reach patient by phone. Letter mailed to patient. Patient has my number if questions arise. ADDEND: 1/31/22- No response to letter sent on 1/3/22. Episode closed.

## 2022-03-18 PROBLEM — Z79.4 TYPE 2 DIABETES MELLITUS WITHOUT COMPLICATION, WITH LONG-TERM CURRENT USE OF INSULIN (HCC): Status: ACTIVE | Noted: 2019-10-18

## 2022-03-18 PROBLEM — E11.9 TYPE 2 DIABETES MELLITUS WITHOUT COMPLICATION, WITH LONG-TERM CURRENT USE OF INSULIN (HCC): Status: ACTIVE | Noted: 2019-10-18

## 2022-03-19 PROBLEM — E66.01 SEVERE OBESITY (HCC): Status: ACTIVE | Noted: 2019-09-18

## 2022-03-19 PROBLEM — I10 ESSENTIAL HYPERTENSION: Status: ACTIVE | Noted: 2020-01-02

## 2022-03-25 RX ORDER — AMLODIPINE BESYLATE 10 MG/1
TABLET ORAL
Qty: 90 TABLET | Refills: 0 | Status: SHIPPED | OUTPATIENT
Start: 2022-03-25 | End: 2022-08-21

## 2022-05-03 RX ORDER — METFORMIN HYDROCHLORIDE 500 MG/1
TABLET, EXTENDED RELEASE ORAL
Qty: 180 TABLET | Refills: 0 | Status: SHIPPED | OUTPATIENT
Start: 2022-05-03 | End: 2022-06-20

## 2022-06-20 RX ORDER — METFORMIN HYDROCHLORIDE 500 MG/1
TABLET, EXTENDED RELEASE ORAL
Qty: 180 TABLET | Refills: 0 | Status: SHIPPED | OUTPATIENT
Start: 2022-06-20 | End: 2022-08-21

## 2022-08-21 RX ORDER — METFORMIN HYDROCHLORIDE 500 MG/1
TABLET, EXTENDED RELEASE ORAL
Qty: 180 TABLET | Refills: 0 | Status: SHIPPED | OUTPATIENT
Start: 2022-08-21 | End: 2022-11-04 | Stop reason: SDUPTHER

## 2022-08-21 RX ORDER — AMLODIPINE BESYLATE 10 MG/1
TABLET ORAL
Qty: 90 TABLET | Refills: 0 | Status: SHIPPED | OUTPATIENT
Start: 2022-08-21

## 2022-11-03 ENCOUNTER — OFFICE VISIT (OUTPATIENT)
Dept: FAMILY MEDICINE CLINIC | Facility: CLINIC | Age: 52
End: 2022-11-03

## 2022-11-03 ENCOUNTER — HOSPITAL ENCOUNTER (OUTPATIENT)
Dept: LAB | Age: 52
Discharge: HOME | End: 2022-11-03

## 2022-11-03 VITALS
SYSTOLIC BLOOD PRESSURE: 152 MMHG | DIASTOLIC BLOOD PRESSURE: 93 MMHG | OXYGEN SATURATION: 98 % | HEIGHT: 67 IN | BODY MASS INDEX: 34.94 KG/M2 | WEIGHT: 222.6 LBS | RESPIRATION RATE: 16 BRPM | HEART RATE: 60 BPM | TEMPERATURE: 96.5 F

## 2022-11-03 DIAGNOSIS — Z23 NEEDS FLU SHOT: Primary | ICD-10-CM

## 2022-11-03 DIAGNOSIS — E11.9 CONTROLLED TYPE 2 DIABETES MELLITUS WITHOUT COMPLICATION, WITHOUT LONG-TERM CURRENT USE OF INSULIN (HCC): ICD-10-CM

## 2022-11-03 DIAGNOSIS — R53.83 FATIGUE, UNSPECIFIED TYPE: ICD-10-CM

## 2022-11-03 DIAGNOSIS — R42 DIZZINESS: ICD-10-CM

## 2022-11-03 DIAGNOSIS — R51.9 FREQUENT HEADACHES: ICD-10-CM

## 2022-11-03 DIAGNOSIS — Z12.11 SCREENING FOR COLON CANCER: ICD-10-CM

## 2022-11-03 LAB
ALBUMIN SERPL-MCNC: 4 G/DL (ref 3.4–5)
ALBUMIN/GLOB SERPL: 1.3 {RATIO} (ref 0.8–1.7)
ALP SERPL-CCNC: 106 U/L (ref 45–117)
ALT SERPL-CCNC: 46 U/L (ref 13–56)
ANION GAP SERPL CALC-SCNC: 4 MMOL/L (ref 3–18)
AST SERPL-CCNC: 14 U/L (ref 10–38)
BASOPHILS # BLD: 0 K/UL (ref 0–0.1)
BASOPHILS NFR BLD: 1 % (ref 0–2)
BILIRUB SERPL-MCNC: 0.4 MG/DL (ref 0.2–1)
BUN SERPL-MCNC: 18 MG/DL (ref 7–18)
BUN/CREAT SERPL: 20 (ref 12–20)
CALCIUM SERPL-MCNC: 10.2 MG/DL (ref 8.5–10.1)
CHLORIDE SERPL-SCNC: 104 MMOL/L (ref 100–111)
CO2 SERPL-SCNC: 28 MMOL/L (ref 21–32)
CREAT SERPL-MCNC: 0.88 MG/DL (ref 0.6–1.3)
CREAT UR-MCNC: 68 MG/DL (ref 30–125)
DIFFERENTIAL METHOD BLD: NORMAL
EOSINOPHIL # BLD: 0.2 K/UL (ref 0–0.4)
EOSINOPHIL NFR BLD: 4 % (ref 0–5)
ERYTHROCYTE [DISTWIDTH] IN BLOOD BY AUTOMATED COUNT: 13.8 % (ref 11.6–14.5)
EST. AVERAGE GLUCOSE BLD GHB EST-MCNC: 197 MG/DL
GLOBULIN SER CALC-MCNC: 3.1 G/DL (ref 2–4)
GLUCOSE SERPL-MCNC: 185 MG/DL (ref 74–99)
HBA1C MFR BLD: 8.5 % (ref 4.2–5.6)
HCT VFR BLD AUTO: 39.4 % (ref 35–45)
HGB BLD-MCNC: 13 G/DL (ref 12–16)
IMM GRANULOCYTES # BLD AUTO: 0 K/UL (ref 0–0.04)
IMM GRANULOCYTES NFR BLD AUTO: 0 % (ref 0–0.5)
LYMPHOCYTES # BLD: 2.2 K/UL (ref 0.9–3.6)
LYMPHOCYTES NFR BLD: 47 % (ref 21–52)
MCH RBC QN AUTO: 27.8 PG (ref 24–34)
MCHC RBC AUTO-ENTMCNC: 33 G/DL (ref 31–37)
MCV RBC AUTO: 84.4 FL (ref 78–100)
MICROALBUMIN UR-MCNC: 2.96 MG/DL (ref 0–3)
MICROALBUMIN/CREAT UR-RTO: 44 MG/G (ref 0–30)
MONOCYTES # BLD: 0.3 K/UL (ref 0.05–1.2)
MONOCYTES NFR BLD: 6 % (ref 3–10)
NEUTS SEG # BLD: 2 K/UL (ref 1.8–8)
NEUTS SEG NFR BLD: 42 % (ref 40–73)
NRBC # BLD: 0 K/UL (ref 0–0.01)
NRBC BLD-RTO: 0 PER 100 WBC
PLATELET # BLD AUTO: 269 K/UL (ref 135–420)
PMV BLD AUTO: 10.3 FL (ref 9.2–11.8)
POTASSIUM SERPL-SCNC: 4.5 MMOL/L (ref 3.5–5.5)
PROT SERPL-MCNC: 7.1 G/DL (ref 6.4–8.2)
RBC # BLD AUTO: 4.67 M/UL (ref 4.2–5.3)
SODIUM SERPL-SCNC: 136 MMOL/L (ref 136–145)
WBC # BLD AUTO: 4.8 K/UL (ref 4.6–13.2)

## 2022-11-03 PROCEDURE — 85025 COMPLETE CBC W/AUTO DIFF WBC: CPT

## 2022-11-03 PROCEDURE — 83036 HEMOGLOBIN GLYCOSYLATED A1C: CPT

## 2022-11-03 PROCEDURE — 99214 OFFICE O/P EST MOD 30 MIN: CPT | Performed by: FAMILY MEDICINE

## 2022-11-03 PROCEDURE — 3052F HG A1C>EQUAL 8.0%<EQUAL 9.0%: CPT | Performed by: FAMILY MEDICINE

## 2022-11-03 PROCEDURE — 87086 URINE CULTURE/COLONY COUNT: CPT

## 2022-11-03 PROCEDURE — 82043 UR ALBUMIN QUANTITATIVE: CPT

## 2022-11-03 PROCEDURE — 80053 COMPREHEN METABOLIC PANEL: CPT

## 2022-11-03 PROCEDURE — 3074F SYST BP LT 130 MM HG: CPT | Performed by: FAMILY MEDICINE

## 2022-11-03 PROCEDURE — 3079F DIAST BP 80-89 MM HG: CPT | Performed by: FAMILY MEDICINE

## 2022-11-03 PROCEDURE — 90471 IMMUNIZATION ADMIN: CPT | Performed by: FAMILY MEDICINE

## 2022-11-03 PROCEDURE — 90686 IIV4 VACC NO PRSV 0.5 ML IM: CPT | Performed by: FAMILY MEDICINE

## 2022-11-03 NOTE — PROGRESS NOTES
HPI  Arnol Call is a 47 y.o. female being seen today for   Chief Complaint   Patient presents with    Diabetes    Back Pain     And neck    Immunization/Injection     Flu   Follow up for this pt with history of diabetes. She was last seen a year ago and her a1c was 7.1    she states that she is taking all her medicines    She states she is not feeling well. For over a month she has headaches, fatigue, dizziness. It comes and goes but she feels bad more than she feels good. Sometimes she loses her balance or feels like she might pass out. She has not been checking her sugar. Her living situation has been unstable and she has not been able to eat like she should. She does have excess thirst and urination. Headaches are at left temple or back of head. They go away with rest. No chest pain or shortness of breath. Some nausea, no vomiting or diarrhea. Past Medical History:   Diagnosis Date    Asthma     Bronchitis     Diabetes ([facility]     Hiatal hernia 2013    Hypertension     Knee deformity, acquired 1996    left knee    Pancreatitis 2005         ROS  Patient states that she is feeling well. Denies complaints of chest pain, shortness of breath, swelling of legs, dizziness or weakness. she denies nausea, vomiting or diarrhea. Current Outpatient Medications   Medication Sig    metFORMIN ER (GLUCOPHAGE XR) 500 mg tablet TAKE 2 TABLETS BY MOUTH ONCE DAILY (WITH DINNER)    amLODIPine (NORVASC) 10 mg tablet Take 1 tablet by mouth once daily    losartan (COZAAR) 100 mg tablet Take 1 Tablet by mouth daily. flash glucose sensor (FreeStyle Tawanna 2 Sensor) kit Use as directed    flash glucose scanning reader (FreeStyle Tawanna 2 [place]) [facility] Use as directed    fluticasone propionate (FLONASE) 50 mcg/actuation nasal spray 1 [place] by Both Nostrils route daily. naproxen (NAPROSYN) 500 mg tablet Take 1 Tab by mouth two (2) times daily (with meals).  (Patient not taking: Reported on 11/16/2021)     No current facility-administered medications for this visit. PE  Visit Vitals  BP (!) 152/93 (BP 1 Location: Right upper arm, BP Patient Position: Sitting, BP Cuff Size: Adult)   Pulse 60   Temp (!) 96.5 °F (35.8 °C) (Temporal)   Resp 16   Ht 5' 7\" (1.702 m)   Wt 222 lb 9.6 oz (101 kg)   LMP 01/24/2014 (LMP Unknown)   SpO2 98%   BMI 34.86 kg/m²        Alert and oriented with normal mood and affect. she is well developed and well nourished . Lungs are clear without wheezing. Heart rate is regular without murmurs or gallops. There is no lower extremity edema. Neuro grossly intact. Assessment and Plan:        ICD-10-CM ICD-9-CM    1. Needs flu shot  Z23 V04.81 INFLUENZA, FLUARIX, FLULAVAL, FLUZONE (AGE 6 MO+), AFLURIA(AGE 3Y+) IM, PF, 0.5 ML      2. Controlled type 2 diabetes mellitus without complication, without long-term current use of insulin ([facility])  N47.7 861.08 METABOLIC PANEL, COMPREHENSIVE      MICROALBUMIN, UR, RAND W/ MICROALB/CREAT RATIO      HEMOGLOBIN A1C WITH EAG      3. Screening for colon cancer  Z12.11 V76.51 OCCULT BLOOD IMMUNOASSAY,DIAGNOSTIC        Unclear cause of sx  Urine does not show excess glucose  Her bp may be up due to discomfort. She has her meds  Will await results of labs and call her  If not improving, consider neuro referral for dizziness.        Izzy Khan MD

## 2022-11-03 NOTE — PATIENT INSTRUCTIONS
Vaccine Information Statement    Influenza (Flu) Vaccine (Inactivated or Recombinant): What You Need to Know    Many vaccine information statements are available in [place]i and other languages. See www.immunize.org/vis. Hojas de información sobre vacunas están disponibles en español y en muchos otros idiomas. Visite www.immunize.org/vis. 1. Why get vaccinated? Influenza vaccine can prevent influenza (flu). Flu is a contagious disease that spreads around the [facility] every year, usually between October and May. Anyone can get the flu, but it is more dangerous for some people. Infants and young children, people 72 years and older, pregnant people, and people with certain health conditions or a weakened immune system are at greatest risk of flu complications. Pneumonia, bronchitis, sinus infections, and ear infections are examples of flu-related complications. If you have a medical condition, such as heart disease, cancer, or diabetes, flu can make it worse. Flu can cause fever and chills, sore throat, muscle aches, fatigue, cough, headache, and runny or stuffy nose. Some people may have vomiting and diarrhea, though this is more common in children than adults. In an average year, thousands of people in the [facility] die from flu, and many more are hospitalized. Flu vaccine prevents millions of illnesses and flu-related visits to the doctor each year. 2. Influenza vaccines     CDC recommends everyone 6 months and older get vaccinated every flu season. Children 6 months through 6years of age may need 2 doses during a single flu season. Everyone else needs only 1 dose each flu season. It takes about 2 weeks for protection to develop after vaccination. There are many flu viruses, and they are always changing. Each year a new flu vaccine is made to protect against the influenza viruses believed to be likely to cause disease in the upcoming flu season.  Even when the vaccine doesnt exactly match these viruses, it may still provide some protection. Influenza vaccine does not cause flu. Influenza vaccine may be given at the same time as other vaccines. 3. Talk with your health care provider    Tell your vaccination provider if the person getting the vaccine:  Has had an allergic reaction after a previous dose of influenza vaccine, or has any severe, life-threatening allergies   Has ever had Guillain-Barré Syndrome (also called GBS)    In some cases, your health care provider may decide to postpone influenza vaccination until a future visit. Influenza vaccine can be administered at any time during pregnancy. People who are or will be pregnant during influenza season should receive inactivated influenza vaccine. People with minor illnesses, such as a cold, may be vaccinated. People who are moderately or severely ill should usually wait until they recover before getting influenza vaccine. Your health care provider can give you more information. 4. Risks of a vaccine reaction    Soreness, redness, and swelling where the shot is given, fever, muscle aches, and headache can happen after influenza vaccination. There may be a very small increased risk of Guillain-Barré Syndrome (GBS) after inactivated influenza vaccine (the flu shot). Chapis Baird children who get the flu shot along with pneumococcal vaccine (PCV13) and/or DTaP vaccine at the same time might be slightly more likely to have a seizure caused by fever. Tell your health care provider if a child who is getting flu vaccine has ever had a seizure. People sometimes faint after medical procedures, including vaccination. Tell your provider if you feel dizzy or have vision changes or ringing in the ears. As with any medicine, there is a very remote chance of a vaccine causing a severe allergic reaction, other serious injury, or death. 5. What if there is a serious problem?     An allergic reaction could occur after the vaccinated person leaves the clinic. If you see signs of a severe allergic reaction (hives, swelling of the face and throat, difficulty breathing, a fast heartbeat, dizziness, or weakness), call 9-1-1 and get the person to the nearest hospital.    For other signs that concern you, call your health care provider. Adverse reactions should be reported to the Vaccine Adverse Event Reporting System (VAERS). Your health care provider will usually file this report, or you can do it yourself. Visit the VAERS website at www.vaers. [facility].gov or call 1-421.425.8919. VAERS is only for reporting reactions, and VAERS staff members do not give medical advice. 6. The National Vaccine Injury Compensation Program    The [facility] Vaccine Injury Compensation Program (VICP) is a federal program that was created to compensate people who may have been injured by certain vaccines. Claims regarding alleged injury or death due to vaccination have a time limit for filing, which may be as short as two years. Visit the VICP website at www.[facility].gov/vaccinecompensation or call 2-510.195.4100 to learn about the program and about filing a claim. 7. How can I learn more? Ask your health care provider. Call your local or state health department. Visit the website of the Food and Drug Administration (FDA) for vaccine package inserts and additional information at www.fda.gov/vaccines-blood-biologics/vaccines. Contact the Centers for Disease Control and Prevention (CDC): Call 0-848.288.3920 (1-800-CDC-INFO) or  Visit CDCs influenza website at www.cdc.gov/flu. Vaccine Information Statement   Inactivated Influenza Vaccine   8/6/2021  [address] Ermalinda Draft [id number]   Department of Health and Human Services  Centers for Disease Control and Prevention    Office Use Only

## 2022-11-03 NOTE — PROGRESS NOTES
Patient who presents for routine immunizations. Patient denies any symptoms , reactions or allergies that would exclude them from being immunized today. Risks and adverse reactions were discussed and the VIS was given to them. All questions were addressed. Patient was observed for 10 min post injection. There were no reactions observed. Diabetic foot exam performed by Albina Richards LPN      Measurement  Response Nurse Comment Physician Comment   Monofilament  R - 6/6  L - 6/6     Pulse DP R - present  L - present     Pulse TP R - present  L - present     Structural deformity R - None  L - None     Skin Integrity / Deformity R - None  L - None        Reviewed by: Fit kit given to the patient instructions explained to the patient patient states understands instructions    Discharge instructions reviewed with patient    Medication list and understanding of medications reviewed with patient. OTC and herbal medications reviewed and added to med list if applicable  Barriers to adherence assessed. Guidance given regarding new medications this visit, including reason for taking this medicine, and common side effects. AVS given to patient. Explained to patient. Patient expressed understanding.

## 2022-11-04 ENCOUNTER — TELEPHONE (OUTPATIENT)
Dept: FAMILY MEDICINE CLINIC | Facility: CLINIC | Age: 52
End: 2022-11-04

## 2022-11-04 RX ORDER — CYCLOBENZAPRINE HCL 10 MG
10 TABLET ORAL
Qty: 30 TABLET | Refills: 1 | Status: SHIPPED | OUTPATIENT
Start: 2022-11-04

## 2022-11-04 RX ORDER — METFORMIN HYDROCHLORIDE 500 MG/1
1000 TABLET, EXTENDED RELEASE ORAL 2 TIMES DAILY
Qty: 180 TABLET | Refills: 0 | Status: SHIPPED | OUTPATIENT
Start: 2022-11-04

## 2022-11-04 NOTE — TELEPHONE ENCOUNTER
Called pt. Her labs do show her a1c is up to 8.5    Considering her sx present for a month, it is possible that recent blood sugars are even higher than suggested by her a1c. Her symptoms are not typical for sugars in this range but on chart review she did have many of the same issues back in 2019 before her diabetes was controlled. She also had severe neck and arm pain at that time which improved on its own along the same time frame as sugar was improved. In 2019 she did require insulin. She has noticed some soreness of neck and back as well. It is not to the degree she had before. Flexeril had given temporary relief. Some scratchy throat today    Will increase metformin to 1000mg bid  Send in flexeril  Neuro referral placed and if she does not improve will follow up with neuro.

## 2022-11-05 LAB
BACTERIA SPEC CULT: NORMAL
SERVICE CMNT-IMP: NORMAL

## 2022-11-07 ENCOUNTER — TELEPHONE (OUTPATIENT)
Dept: FAMILY MEDICINE CLINIC | Facility: CLINIC | Age: 52
End: 2022-11-07

## 2022-11-07 NOTE — TELEPHONE ENCOUNTER
Patient advised of appointment Neurology Dr Hendrix Half  3/3/2023 8:20 am [address] Referral with appointment information mailed to patient

## 2022-12-02 DIAGNOSIS — Z12.11 SCREENING FOR COLON CANCER: ICD-10-CM

## 2023-03-17 ENCOUNTER — TELEPHONE (OUTPATIENT)
Age: 53
End: 2023-03-17

## 2023-03-17 NOTE — TELEPHONE ENCOUNTER
Spoke with patient states didn't go to the appointment that was scheduled neurologist Dr Gail Post  3/3/23.   Patient given Dr Omalley Failing telephone # [phone] states she will call and reschedule

## 2025-03-13 ENCOUNTER — CLINICAL DOCUMENTATION (OUTPATIENT)
Age: 55
End: 2025-03-13

## 2025-03-13 NOTE — PROGRESS NOTES
Patient came in for an visit. Patient has insurance. Patient was not aware her medicaid was active. Insurance information from \"[employer]\" printed out and given to patient. Advised patient to call the number on the insurance print out to find a PCP who accepts her insurance. Patient expressed understanding.